# Patient Record
Sex: MALE | Race: WHITE | Employment: FULL TIME | URBAN - NONMETROPOLITAN AREA
[De-identification: names, ages, dates, MRNs, and addresses within clinical notes are randomized per-mention and may not be internally consistent; named-entity substitution may affect disease eponyms.]

---

## 2017-10-04 ENCOUNTER — TELEPHONE (OUTPATIENT)
Dept: FAMILY MEDICINE CLINIC | Age: 67
End: 2017-10-04

## 2017-10-04 NOTE — TELEPHONE ENCOUNTER
10/04/2017 Patient called office saying he tore something in  Left shoulder and in severe pain. Patient wanted to see Giovani Grant this morning however Wing's schedule full. Please advise patient at work [phone]. da

## 2017-10-04 NOTE — TELEPHONE ENCOUNTER
Sorry did not see message until now. Can see tomorrow. Can take advil and ice it until he comes in.   Otherwise can go to urgent care

## 2017-10-05 ENCOUNTER — OFFICE VISIT (OUTPATIENT)
Dept: FAMILY MEDICINE CLINIC | Age: 67
End: 2017-10-05
Payer: MEDICARE

## 2017-10-05 VITALS
RESPIRATION RATE: 15 BRPM | DIASTOLIC BLOOD PRESSURE: 79 MMHG | HEART RATE: 68 BPM | BODY MASS INDEX: 25.82 KG/M2 | WEIGHT: 194.8 LBS | HEIGHT: 73 IN | SYSTOLIC BLOOD PRESSURE: 139 MMHG

## 2017-10-05 DIAGNOSIS — M54.12 CERVICAL RADICULOPATHY: Primary | ICD-10-CM

## 2017-10-05 PROCEDURE — 1123F ACP DISCUSS/DSCN MKR DOCD: CPT | Performed by: NURSE PRACTITIONER

## 2017-10-05 PROCEDURE — 1036F TOBACCO NON-USER: CPT | Performed by: NURSE PRACTITIONER

## 2017-10-05 PROCEDURE — G8484 FLU IMMUNIZE NO ADMIN: HCPCS | Performed by: NURSE PRACTITIONER

## 2017-10-05 PROCEDURE — 3017F COLORECTAL CA SCREEN DOC REV: CPT | Performed by: NURSE PRACTITIONER

## 2017-10-05 PROCEDURE — G8419 CALC BMI OUT NRM PARAM NOF/U: HCPCS | Performed by: NURSE PRACTITIONER

## 2017-10-05 PROCEDURE — G8427 DOCREV CUR MEDS BY ELIG CLIN: HCPCS | Performed by: NURSE PRACTITIONER

## 2017-10-05 PROCEDURE — 99213 OFFICE O/P EST LOW 20 MIN: CPT | Performed by: NURSE PRACTITIONER

## 2017-10-05 PROCEDURE — 4040F PNEUMOC VAC/ADMIN/RCVD: CPT | Performed by: NURSE PRACTITIONER

## 2017-10-05 ASSESSMENT — ENCOUNTER SYMPTOMS
RESPIRATORY NEGATIVE: 1
EYES NEGATIVE: 1
GASTROINTESTINAL NEGATIVE: 1

## 2017-10-05 NOTE — PROGRESS NOTES
Subjective:      Patient ID: Jack Hood is a 79 y.o. male. HPI  Chief Complaint   Patient presents with    Shoulder Pain     Left side started in June      Patient's medications, allergies, past medical, surgical, social and family histories were reviewed and updated as appropriate. There is no problem list on file for this patient. No Known Allergies  Health Maintenance   Topic Date Due    AAA screen  1950    Hepatitis C screen  1950    DTaP/Tdap/Td vaccine (1 - Tdap) 08/15/2012    Pneumococcal low/med risk (1 of 2 - PCV13) 05/17/2015    Flu vaccine (1) 09/01/2017    Lipid screen  05/14/2021    Colon cancer screen colonoscopy  06/04/2025    Zostavax vaccine  Addressed     Current Outpatient Prescriptions   Medication Sig Dispense Refill    Psyllium (METAMUCIL PO) Take by mouth      Coenzyme Q10 (COQ10 PO) Take  by mouth.  Omega-3 Fatty Acids (OMEGA 3 PO) Take  by mouth.  Flaxseed, Linseed, (FLAX SEED OIL PO) Take  by mouth.  Jocelyn, Morinda citrifolia, (JOCELYN JUICE PO) Take  by mouth.  Misc Natural Products (BUTCHERS BROOM PO) Take  by mouth.  Multiple Vitamin (MULTI VITAMIN MENS PO) Take  by mouth. No current facility-administered medications for this visit. Interesting pt. Reports 3 months ago started having sharp electrical like pains shooting down left arm. Occur when he lays down or when he turns his head to the right. Tried a chiropractor without relief. Been exercising without relief    Review of Systems   Constitutional: Negative. HENT: Negative. Eyes: Negative. Respiratory: Negative. Cardiovascular: Negative. Gastrointestinal: Negative. Musculoskeletal: Positive for arthralgias and neck pain. Skin: Negative. Neurological: Negative. Objective:   Physical Exam   Constitutional: He is oriented to person, place, and time. He appears well-developed and well-nourished. HENT:   Head: Normocephalic.    Right Ear: Tympanic membrane and external ear normal.   Left Ear: Tympanic membrane and external ear normal.   Nose: Nose normal.   Mouth/Throat: Oropharynx is clear and moist.   Neck: Normal range of motion. Neck supple. Cardiovascular: Normal rate, regular rhythm, normal heart sounds and intact distal pulses. Exam reveals no gallop and no friction rub. No murmur heard. Pulmonary/Chest: Effort normal and breath sounds normal. He has no wheezes. He has no rales. Abdominal: Soft. Bowel sounds are normal. There is no tenderness. There is no guarding. Musculoskeletal:        Cervical back: He exhibits decreased range of motion, tenderness, pain and spasm. Back:         Arms:  Lymphadenopathy:     He has no cervical adenopathy. Neurological: He is alert and oriented to person, place, and time. He has normal reflexes. Skin: Skin is warm. Psychiatric: He has a normal mood and affect. Assessment:      1. Cervical radiculopathy  MRI CERVICAL SPINE WO CONTRAST           Plan: With his atrophy I would like to have mri.   Pt has tried and failed conservative therapy

## 2017-10-05 NOTE — PATIENT INSTRUCTIONS
MRI Cervical spine WO contrast scheduled 10-9-17 @ 1:30PM - arrival time 1:00PM @ [facility] - pt is to bring med list - photo ID- insurance card to appt - pt notified and understanding voiced

## 2017-10-05 NOTE — MR AVS SNAPSHOT
your BMI by decreasing your calorie intake and becoming more physically active. Learn more at: [employer]             Today's Medication Changes          These changes are accurate as of: 10/5/17  1:48 PM.  If you have any questions, ask your nurse or doctor. STOP taking these medications           ACIDOPHILUS PO   Stopped by:  Barbara Barth NP       gabapentin 300 MG capsule   Commonly known as:  NEURONTIN   Stopped by:  Barbara Barth NP       GREEN TEA PO   Stopped by:  Barbara Barth NP       KRILL OIL PO   Stopped by:  Barbara Barth NP       valACYclovir 1 g tablet   Commonly known as:  VALTREX   Stopped by:  Barbara Barth NP               Your Current Medications Are              Psyllium (METAMUCIL PO) Take by mouth    Coenzyme Q10 (COQ10 PO) Take  by mouth. Omega-3 Fatty Acids (OMEGA 3 PO) Take  by mouth. Flaxseed, Linseed, (FLAX SEED OIL PO) Take  by mouth. Jocelyn, Morinda citrifolia, (JOCELYN JUICE PO) Take  by mouth. Misc Natural Products (BUTCHERS BROOM PO) Take  by mouth. Multiple Vitamin (MULTI VITAMIN MENS PO) Take  by mouth. Allergies           No Known Allergies         Additional Information        Basic Information     Date Of Birth Sex Race Ethnicity Preferred Language    1950 Male White Non-/Non  English      Problem List as of 10/5/2017  Date Reviewed: 1/27/2015          None      Immunizations as of 10/5/2017     Name Date    Pneumococcal Polysaccharide (Dpgbscynh50) 8/14/2012    Td 8/14/2012      Preventive Care        Date Due    One-time abdominal aortic aneurism (AAA) screening is recommended for all men between the age of 73-68 who have ever smoked 1950    Hepatitis C screening is recommended for all adults regardless of risk factors born between [facility] at least once (lifetime) who have never been tested.  1950    Tetanus Combination Vaccine (1 - Tdap) 8/15/2012 Pneumococcal Vaccines (two) for all adults aged 72 and over (1 of 2 - PCV13) 5/17/2015    Yearly Flu Vaccine (1) 9/1/2017    Cholesterol Screening 5/14/2021    Colonoscopy 6/4/2025            MyChart Signup           [facility] allows you to send messages to your doctor, view your test results, renew your prescriptions, schedule appointments, view visit notes, and more. How Do I Sign Up? 1. In your Internet browser, go to [email or web address]  2. Click on the Sign Up Now link in the Sign In box. You will see the New Member Sign Up page. 3. Enter your [facility] Access Code exactly as it appears below. You will not need to use this code after youve completed the sign-up process. If you do not sign up before the expiration date, you must request a new code. [facility] Access Code: [email or web address]  Expires: 12/4/2017  1:48 PM    4. Enter your Social Security Number ([id number]) and Date of Birth (mm/dd/yyyy) as indicated and click Submit. You will be taken to the next sign-up page. 5. Create a [facility] ID. This will be your [facility] login ID and cannot be changed, so think of one that is secure and easy to remember. 6. Create a [facility] password. You can change your password at any time. 7. Enter your Password Reset Question and Answer. This can be used at a later time if you forget your password. 8. Enter your e-mail address. You will receive e-mail notification when new information is available in [id number]. 9. Click Sign Up. You can now view your medical record. Additional Information  If you have questions, please contact the physician practice where you receive care. Remember, [facility] is NOT to be used for urgent needs. For medical emergencies, dial 911. For questions regarding your [facility] account call [phone]. If you have a clinical question, please call your doctor's office.

## 2017-10-09 ENCOUNTER — TELEPHONE (OUTPATIENT)
Dept: FAMILY MEDICINE CLINIC | Age: 67
End: 2017-10-09

## 2017-10-09 ENCOUNTER — HOSPITAL ENCOUNTER (OUTPATIENT)
Dept: MRI IMAGING | Age: 67
Discharge: HOME | End: 2017-10-09
Payer: MEDICARE

## 2017-10-09 DIAGNOSIS — M48.02 CERVICAL SPINAL STENOSIS: Primary | ICD-10-CM

## 2017-10-09 DIAGNOSIS — M54.12 CERVICAL RADICULOPATHY: ICD-10-CM

## 2017-10-09 PROCEDURE — 72141 MRI NECK SPINE W/O DYE: CPT

## 2017-10-09 NOTE — TELEPHONE ENCOUNTER
Please call pt. 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 This is what is causing his symptoms. I recommend to see a neurosurgeon. There is DENNISO. The new neurosurgeon at [facility].   And we also have a lot of patients that go to [place]

## 2017-10-10 NOTE — TELEPHONE ENCOUNTER
Called and spoke with Karen Landeros at Dr. Hilda Downs office, he is scheduling out until December 19th, stated she can get patient in with Dr. Natalie Wei partner named Dr. Susan Andrade in [place]. (spoke with cynthia and this is ok)      Scheduled patient with Dr. Susan Andrade for October 19th, Thursday at 1:00pm, arrive 15-20min prior. [facility], [address] [phone]   [phone]  Patient needs to bring insurance card, photo ID, med list, and get the [facility] [place] from [facility] [address] from MRI cervical spine. Faxed office notes, patient summary, and MRI results to Dr. Susan Andrade office.      **left message for patient to call back to notify of appointment date and time**

## 2017-10-23 ENCOUNTER — HOSPITAL ENCOUNTER (OUTPATIENT)
Age: 67
Discharge: HOME | End: 2017-10-23
Payer: MEDICARE

## 2017-10-23 ENCOUNTER — HOSPITAL ENCOUNTER (OUTPATIENT)
Dept: GENERAL RADIOLOGY | Age: 67
Discharge: HOME | End: 2017-10-23
Payer: MEDICARE

## 2017-10-23 DIAGNOSIS — Z01.811 PRE-OP CHEST EXAM: ICD-10-CM

## 2017-10-23 LAB
ANION GAP SERPL CALCULATED.3IONS-SCNC: 14 MEQ/L (ref 8–16)
APTT: 26.5 SECONDS (ref 22–38)
BASOPHILS # BLD: 0.8 %
BASOPHILS ABSOLUTE: 0 THOU/MM3 (ref 0–0.1)
BUN BLDV-MCNC: 12 MG/DL (ref 7–22)
CALCIUM SERPL-MCNC: 9.7 MG/DL (ref 8.5–10.5)
CHLORIDE BLD-SCNC: 102 MEQ/L (ref 98–111)
CO2: 27 MEQ/L (ref 23–33)
CREAT SERPL-MCNC: 0.6 MG/DL (ref 0.4–1.2)
EOSINOPHIL # BLD: 4.2 %
EOSINOPHILS ABSOLUTE: 0.2 THOU/MM3 (ref 0–0.4)
GFR SERPL CREATININE-BSD FRML MDRD: > 90 ML/MIN/1.73M2
GLUCOSE BLD-MCNC: 95 MG/DL (ref 70–108)
HCT VFR BLD CALC: 40.5 % (ref 42–52)
HEMOGLOBIN: 13.8 GM/DL (ref 14–18)
INR BLD: 0.85 (ref 0.85–1.13)
LYMPHOCYTES # BLD: 27.8 %
LYMPHOCYTES ABSOLUTE: 1.3 THOU/MM3 (ref 1–4.8)
MCH RBC QN AUTO: 33.1 PG (ref 27–31)
MCHC RBC AUTO-ENTMCNC: 34 GM/DL (ref 33–37)
MCV RBC AUTO: 97.2 FL (ref 80–94)
MONOCYTES # BLD: 12.5 %
MONOCYTES ABSOLUTE: 0.6 THOU/MM3 (ref 0.4–1.3)
NUCLEATED RED BLOOD CELLS: 0 /100 WBC
PDW BLD-RTO: 12.8 % (ref 11.5–14.5)
PLATELET # BLD: 202 THOU/MM3 (ref 130–400)
PMV BLD AUTO: 10.1 MCM (ref 7.4–10.4)
POTASSIUM SERPL-SCNC: 4.3 MEQ/L (ref 3.5–5.2)
RBC # BLD: 4.16 MILL/MM3 (ref 4.7–6.1)
RBC # BLD: NORMAL 10*6/UL
SEG NEUTROPHILS: 54.7 %
SEGMENTED NEUTROPHILS ABSOLUTE COUNT: 2.5 THOU/MM3 (ref 1.8–7.7)
SODIUM BLD-SCNC: 143 MEQ/L (ref 135–145)
WBC # BLD: 4.5 THOU/MM3 (ref 4.8–10.8)

## 2017-10-23 PROCEDURE — 93005 ELECTROCARDIOGRAM TRACING: CPT

## 2017-10-23 PROCEDURE — 71020 XR CHEST STANDARD TWO VW: CPT

## 2017-10-23 PROCEDURE — 85610 PROTHROMBIN TIME: CPT

## 2017-10-23 PROCEDURE — 85730 THROMBOPLASTIN TIME PARTIAL: CPT

## 2017-10-23 PROCEDURE — 80048 BASIC METABOLIC PNL TOTAL CA: CPT

## 2017-10-23 PROCEDURE — 36415 COLL VENOUS BLD VENIPUNCTURE: CPT

## 2017-10-23 PROCEDURE — 85025 COMPLETE CBC W/AUTO DIFF WBC: CPT

## 2017-10-24 LAB
EKG ATRIAL RATE: 70 BPM
EKG P AXIS: 26 DEGREES
EKG P-R INTERVAL: 192 MS
EKG Q-T INTERVAL: 412 MS
EKG QRS DURATION: 110 MS
EKG QTC CALCULATION (BAZETT): 444 MS
EKG R AXIS: 71 DEGREES
EKG T AXIS: 67 DEGREES
EKG VENTRICULAR RATE: 70 BPM

## 2017-10-31 ENCOUNTER — OFFICE VISIT (OUTPATIENT)
Dept: FAMILY MEDICINE CLINIC | Age: 67
End: 2017-10-31
Payer: MEDICARE

## 2017-10-31 VITALS
HEART RATE: 68 BPM | DIASTOLIC BLOOD PRESSURE: 82 MMHG | WEIGHT: 196.4 LBS | HEIGHT: 73 IN | SYSTOLIC BLOOD PRESSURE: 138 MMHG | BODY MASS INDEX: 26.03 KG/M2 | RESPIRATION RATE: 16 BRPM

## 2017-10-31 DIAGNOSIS — M54.12 CERVICAL RADICULOPATHY: ICD-10-CM

## 2017-10-31 DIAGNOSIS — M48.02 CERVICAL SPINAL STENOSIS: Primary | ICD-10-CM

## 2017-10-31 PROCEDURE — G8427 DOCREV CUR MEDS BY ELIG CLIN: HCPCS | Performed by: NURSE PRACTITIONER

## 2017-10-31 PROCEDURE — 4040F PNEUMOC VAC/ADMIN/RCVD: CPT | Performed by: NURSE PRACTITIONER

## 2017-10-31 PROCEDURE — G8484 FLU IMMUNIZE NO ADMIN: HCPCS | Performed by: NURSE PRACTITIONER

## 2017-10-31 PROCEDURE — 3017F COLORECTAL CA SCREEN DOC REV: CPT | Performed by: NURSE PRACTITIONER

## 2017-10-31 PROCEDURE — 1036F TOBACCO NON-USER: CPT | Performed by: NURSE PRACTITIONER

## 2017-10-31 PROCEDURE — 99214 OFFICE O/P EST MOD 30 MIN: CPT | Performed by: NURSE PRACTITIONER

## 2017-10-31 PROCEDURE — G8419 CALC BMI OUT NRM PARAM NOF/U: HCPCS | Performed by: NURSE PRACTITIONER

## 2017-10-31 PROCEDURE — 1123F ACP DISCUSS/DSCN MKR DOCD: CPT | Performed by: NURSE PRACTITIONER

## 2017-10-31 NOTE — PROGRESS NOTES
Subjective:    Chief Complaint:     Chel Maloney is a 79 y.o. male who presents for a preoperative physical examination. He is scheduled to have cervical fusion c5-c7 done by Dr. Marlon Acosta at [address] [facility] on 11/17. History of Present Illness:      Progressive neck pain with pain into left arm. Has developed atrophy and weakness into arm    Past Medical History:   Diagnosis Date    Blockage of coronary artery of heart ([facility] [address] 2010    40-50% blockage    Hyperlipidemia         Review of patient's past surgical history indicates:     Past Surgical History:   Procedure Laterality Date    CARDIAC CATHETERIZATION      HERNIA REPAIR      right inguinal                                                   Current Outpatient Prescriptions   Medication Sig Dispense Refill    Psyllium (METAMUCIL PO) Take by mouth      Coenzyme Q10 (COQ10 PO) Take  by mouth.  Omega-3 Fatty Acids (OMEGA 3 PO) Take  by mouth.  Jocelyn, Morinda citrifolia, (JOCELYN JUICE PO) Take  by mouth.  Misc Natural Products (BUTCHERS BROOM PO) Take  by mouth.  Multiple Vitamin (MULTI VITAMIN MENS PO) Take  by mouth. No current facility-administered medications for this visit.         No Known Allergies    Social History   Substance Use Topics    Smoking status: Former Smoker     Quit date: 8/14/1991    Smokeless tobacco: Never Used    Alcohol use 1.5 oz/week     3 drink(s) per week      Comment: daily        Family History   Problem Relation Age of Onset    Cancer Paternal Uncle [de-identified]     prostate cancer    High Blood Pressure Paternal Uncle     High Cholesterol Paternal Uncle     Heart Disease Paternal Grandfather 58     MI        Review Of Systems    Skin: no abnormal pigmentation, rash, scaling, itching, masses, hair or nail changes  Eyes: negative  Ears/Nose/Throat: negative  Respiratory: negative  Cardiovascular: negative  Gastrointestinal: negative  Genitourinary: negative  Musculoskeletal: negative  Neurologic:

## 2017-11-02 ENCOUNTER — OFFICE VISIT (OUTPATIENT)
Dept: CARDIOLOGY CLINIC | Age: 67
End: 2017-11-02
Payer: MEDICARE

## 2017-11-02 VITALS
HEIGHT: 73 IN | BODY MASS INDEX: 26.03 KG/M2 | WEIGHT: 196.4 LBS | SYSTOLIC BLOOD PRESSURE: 136 MMHG | HEART RATE: 84 BPM | DIASTOLIC BLOOD PRESSURE: 86 MMHG

## 2017-11-02 DIAGNOSIS — Z87.891 EX-SMOKER: ICD-10-CM

## 2017-11-02 DIAGNOSIS — Z01.818 PRE-OP EVALUATION: Primary | ICD-10-CM

## 2017-11-02 DIAGNOSIS — R94.31 ABNORMAL EKG: ICD-10-CM

## 2017-11-02 PROCEDURE — 4040F PNEUMOC VAC/ADMIN/RCVD: CPT | Performed by: INTERNAL MEDICINE

## 2017-11-02 PROCEDURE — 99204 OFFICE O/P NEW MOD 45 MIN: CPT | Performed by: INTERNAL MEDICINE

## 2017-11-02 PROCEDURE — G8419 CALC BMI OUT NRM PARAM NOF/U: HCPCS | Performed by: INTERNAL MEDICINE

## 2017-11-02 PROCEDURE — 1123F ACP DISCUSS/DSCN MKR DOCD: CPT | Performed by: INTERNAL MEDICINE

## 2017-11-02 PROCEDURE — G8484 FLU IMMUNIZE NO ADMIN: HCPCS | Performed by: INTERNAL MEDICINE

## 2017-11-02 PROCEDURE — 3017F COLORECTAL CA SCREEN DOC REV: CPT | Performed by: INTERNAL MEDICINE

## 2017-11-02 PROCEDURE — 1036F TOBACCO NON-USER: CPT | Performed by: INTERNAL MEDICINE

## 2017-11-02 PROCEDURE — G8427 DOCREV CUR MEDS BY ELIG CLIN: HCPCS | Performed by: INTERNAL MEDICINE

## 2017-11-02 NOTE — PROGRESS NOTES
Chief Complaint   Patient presents with    Cardiac Clearance     neck sx nov 16 with Dr. Kelsey Mesa   Patient here for cardiac clearance -Cervical fusion- neck surgery nov 17 with Dr. Kelsey Mesa    Denied any cp, sob, dizziness, edema or palpitations    Pre op eval  Walk well with no limitation  METs> 4    exmoker - quit 1991 after smoking 18 yrs    FHX   None    Patient Active Problem List   Diagnosis    Pre-op evaluation for cervical fusion    Abnormal EKG    Ex-smoker       Past Surgical History:   Procedure Laterality Date    CARDIAC CATHETERIZATION      HERNIA REPAIR      right inguinal       No Known Allergies     Family History   Problem Relation Age of Onset    Cancer Paternal Uncle [de-identified]     prostate cancer    High Blood Pressure Paternal Uncle     High Cholesterol Paternal Uncle     Heart Disease Paternal Grandfather 58     MI        Social History     Social History    Marital status:      Spouse name: N/A    Number of children: N/A    Years of education: N/A     Occupational History    Not on file. Social History Main Topics    Smoking status: Former Smoker     Quit date: 8/14/1991    Smokeless tobacco: Never Used    Alcohol use 1.5 oz/week     3 drink(s) per week      Comment: daily    Drug use: No    Sexual activity: Not on file     Other Topics Concern    Not on file     Social History Narrative    No narrative on file       Current Outpatient Prescriptions   Medication Sig Dispense Refill    Psyllium (METAMUCIL PO) Take by mouth      Coenzyme Q10 (COQ10 PO) Take  by mouth.  Omega-3 Fatty Acids (OMEGA 3 PO) Take  by mouth.  Jocelyn, Morinda citrifolia, (JOCELYN JUICE PO) Take  by mouth.  Misc Natural Products (BUTCHERS BROOM PO) Take  by mouth.  Multiple Vitamin (MULTI VITAMIN MENS PO) Take  by mouth. No current facility-administered medications for this visit.         Review of Systems -     General ROS: negative  Psychological ROS: negative  Hematological and Lymphatic ROS: No history of blood clots or bleeding disorder. Respiratory ROS: no cough, shortness of breath, or wheezing  Cardiovascular ROS: no chest pain or dyspnea on exertion  Gastrointestinal ROS: negative  Genito-Urinary ROS: no dysuria, trouble voiding, or hematuria  Musculoskeletal ROS: negative  Neurological ROS: no TIA or stroke symptoms  Dermatological ROS: negative      Blood pressure 136/86, pulse 84, height 6' 1\" (1.854 m), weight 196 lb 6.4 oz (89.1 kg). Physical Examination:    General appearance - alert, well appearing, and in no distress  Mental status - alert, oriented to person, place, and time  Neck - supple, no significant adenopathy, no JVD, or carotid bruits  Chest - clear to auscultation, no wheezes, rales or rhonchi, symmetric air entry  Heart - normal rate, regular rhythm, normal S1, S2, no murmurs, rubs, clicks or gallops  Abdomen - soft, nontender, nondistended, no masses or organomegaly  Neurological - alert, oriented, normal speech, no focal findings or movement disorder noted  Musculoskeletal - no joint tenderness, deformity or swelling  Extremities - peripheral pulses normal, no pedal edema, no clubbing or cyanosis  Skin - normal coloration and turgor, no rashes, no suspicious skin lesions noted    Lab  No results for input(s): CKTOTAL, CKMB, CKMBINDEX, TROPONINI in the last 72 hours.   CBC:   Lab Results   Component Value Date    WBC 4.5 10/23/2017    RBC 4.16 10/23/2017    HGB 13.8 10/23/2017    HCT 40.5 10/23/2017    MCV 97.2 10/23/2017    MCH 33.1 10/23/2017    MCHC 34.0 10/23/2017    RDW 12.8 10/23/2017     10/23/2017    MPV 10.1 10/23/2017     BMP:    Lab Results   Component Value Date     10/23/2017    K 4.3 10/23/2017     10/23/2017    CO2 27 10/23/2017    BUN 12 10/23/2017    LABALBU 5.6 05/14/2016    CREATININE 0.6 10/23/2017    CALCIUM 9.7 10/23/2017    LABGLOM >90 10/23/2017    GLUCOSE 95 10/23/2017    GLUCOSE 95 05/14/2016     Hepatic

## 2017-11-13 ENCOUNTER — HOSPITAL ENCOUNTER (OUTPATIENT)
Dept: NON INVASIVE DIAGNOSTICS | Age: 67
Discharge: HOME | End: 2017-11-13
Payer: MEDICARE

## 2017-11-13 DIAGNOSIS — Z01.818 PRE-OP EVALUATION: ICD-10-CM

## 2017-11-13 DIAGNOSIS — R94.31 ABNORMAL EKG: ICD-10-CM

## 2017-11-13 LAB
LV EF: 50 %
LV EF: 63 %
LVEF MODALITY: NORMAL
LVEF MODALITY: NORMAL

## 2017-11-13 PROCEDURE — 93350 STRESS TTE ONLY: CPT

## 2017-11-13 PROCEDURE — 93017 CV STRESS TEST TRACING ONLY: CPT | Performed by: INTERNAL MEDICINE

## 2017-11-13 PROCEDURE — 93306 TTE W/DOPPLER COMPLETE: CPT

## 2017-11-14 ENCOUNTER — TELEPHONE (OUTPATIENT)
Dept: FAMILY MEDICINE CLINIC | Age: 67
End: 2017-11-14

## 2017-11-14 NOTE — TELEPHONE ENCOUNTER
An xray will not give us much information at all. Really the best thing is how he is feeling.   If he feels he is doing significantly better then I would delay surgery

## 2017-11-14 NOTE — TELEPHONE ENCOUNTER
Pt's wife left message for Eric Jackson - she is asking if an Xray,done prior to OR, would be able to show how much progress PT for 2-21/2wks has made - They are curious - they would like xray done tomorrow or Thursday if you feel it would show anything

## 2017-11-15 ENCOUNTER — TELEPHONE (OUTPATIENT)
Dept: CARDIOLOGY CLINIC | Age: 67
End: 2017-11-15

## 2017-11-15 DIAGNOSIS — R94.31 ABNORMAL EKG: Primary | ICD-10-CM

## 2017-11-21 ENCOUNTER — TELEPHONE (OUTPATIENT)
Dept: CARDIOLOGY CLINIC | Age: 67
End: 2017-11-21

## 2017-11-27 ENCOUNTER — TELEPHONE (OUTPATIENT)
Dept: FAMILY MEDICINE CLINIC | Age: 67
End: 2017-11-27

## 2017-11-27 RX ORDER — HYDROCHLOROTHIAZIDE 25 MG/1
25 TABLET ORAL DAILY
Qty: 5 TABLET | Refills: 0 | Status: SHIPPED | OUTPATIENT
Start: 2017-11-27 | End: 2017-12-07 | Stop reason: ALTCHOICE

## 2017-11-27 NOTE — TELEPHONE ENCOUNTER
Pt spouse calling today concerned about ankle swelling. He recently had neck surgery 10 days ago in [place] with Dr. Perfecto Ron. They are aware of swelling and advised pt to call PCP. No fever, redness or warm to touch. Please advise. No open appointment today.

## 2017-12-01 ENCOUNTER — TELEPHONE (OUTPATIENT)
Dept: FAMILY MEDICINE CLINIC | Age: 67
End: 2017-12-01

## 2017-12-01 RX ORDER — AZITHROMYCIN 250 MG/1
TABLET, FILM COATED ORAL
Qty: 1 PACKET | Refills: 0 | Status: SHIPPED | OUTPATIENT
Start: 2017-12-01 | End: 2017-12-07 | Stop reason: ALTCHOICE

## 2017-12-01 NOTE — TELEPHONE ENCOUNTER
Patient wife calling for patient stated he is running a low grade temp of . He feels fatigue and is having a hacking cough with green phlegm, Wife stated he can not be coughing like this because he just had neck surgery and needs antibiotic called in.  Please advise       Pharm[facility]

## 2017-12-07 ENCOUNTER — OFFICE VISIT (OUTPATIENT)
Dept: CARDIOLOGY CLINIC | Age: 67
End: 2017-12-07
Payer: MEDICARE

## 2017-12-07 VITALS
HEIGHT: 73 IN | BODY MASS INDEX: 25.18 KG/M2 | HEART RATE: 84 BPM | DIASTOLIC BLOOD PRESSURE: 84 MMHG | WEIGHT: 190 LBS | SYSTOLIC BLOOD PRESSURE: 140 MMHG

## 2017-12-07 DIAGNOSIS — Z87.891 EX-SMOKER: ICD-10-CM

## 2017-12-07 DIAGNOSIS — R94.31 ABNORMAL EKG: Primary | ICD-10-CM

## 2017-12-07 PROCEDURE — G8427 DOCREV CUR MEDS BY ELIG CLIN: HCPCS | Performed by: INTERNAL MEDICINE

## 2017-12-07 PROCEDURE — 4040F PNEUMOC VAC/ADMIN/RCVD: CPT | Performed by: INTERNAL MEDICINE

## 2017-12-07 PROCEDURE — G8419 CALC BMI OUT NRM PARAM NOF/U: HCPCS | Performed by: INTERNAL MEDICINE

## 2017-12-07 PROCEDURE — 99213 OFFICE O/P EST LOW 20 MIN: CPT | Performed by: INTERNAL MEDICINE

## 2017-12-07 PROCEDURE — G8484 FLU IMMUNIZE NO ADMIN: HCPCS | Performed by: INTERNAL MEDICINE

## 2017-12-07 PROCEDURE — 1123F ACP DISCUSS/DSCN MKR DOCD: CPT | Performed by: INTERNAL MEDICINE

## 2017-12-07 PROCEDURE — 3017F COLORECTAL CA SCREEN DOC REV: CPT | Performed by: INTERNAL MEDICINE

## 2017-12-07 PROCEDURE — 1036F TOBACCO NON-USER: CPT | Performed by: INTERNAL MEDICINE

## 2017-12-07 NOTE — PROGRESS NOTES
Chief Complaint   Patient presents with    Check-Up     echo, holtor monitor   Patient here for cardiac clearance -Cervical fusion- neck surgery nov 17 with Dr. Juliane Holman    Had  neck surgery. And went well    Doing good    Has cervical Collar    Denied any cp, sob, dizziness, edema or palpitations    Pre op eval  Walk well with no limitation  METs> 4    exmoker - quit 1991 after smoking 18 yrs    FHX   None    Patient Active Problem List   Diagnosis    Pre-op evaluation for cervical fusion    Abnormal EKG    Ex-smoker       Past Surgical History:   Procedure Laterality Date    CARDIAC CATHETERIZATION      HERNIA REPAIR      right inguinal       No Known Allergies     Family History   Problem Relation Age of Onset    Cancer Paternal Uncle [de-identified]     prostate cancer    High Blood Pressure Paternal Uncle     High Cholesterol Paternal Uncle     Heart Disease Paternal Grandfather 58     MI        Social History     Social History    Marital status:      Spouse name: N/A    Number of children: N/A    Years of education: N/A     Occupational History    Not on file. Social History Main Topics    Smoking status: Former Smoker     Quit date: 8/14/1991    Smokeless tobacco: Never Used    Alcohol use 1.5 oz/week     3 drink(s) per week      Comment: daily    Drug use: No    Sexual activity: Not on file     Other Topics Concern    Not on file     Social History Narrative    No narrative on file       Current Outpatient Prescriptions   Medication Sig Dispense Refill    Psyllium (METAMUCIL PO) Take by mouth      Coenzyme Q10 (COQ10 PO) Take  by mouth.  Omega-3 Fatty Acids (OMEGA 3 PO) Take  by mouth.  Jocelyn, Morinda citrifolia, (JOCELYN JUICE PO) Take  by mouth.  Misc Natural Products (BUTCHERS BROOM PO) Take  by mouth.  Multiple Vitamin (MULTI VITAMIN MENS PO) Take  by mouth. No current facility-administered medications for this visit.         Review of Systems -     General ROS:

## 2017-12-13 ENCOUNTER — HOSPITAL ENCOUNTER (OUTPATIENT)
Dept: GENERAL RADIOLOGY | Age: 67
Discharge: HOME | End: 2017-12-13
Payer: MEDICARE

## 2017-12-13 PROCEDURE — 93225 XTRNL ECG REC<48 HRS REC: CPT

## 2017-12-13 PROCEDURE — 93226 XTRNL ECG REC<48 HR SCAN A/R: CPT

## 2017-12-13 NOTE — PROCEDURES
Holter monitor applied. Instructed on diary completion and removal. Verbalizes understanding. Will return in 48 hours for removal. Released in satis condition.

## 2017-12-18 NOTE — PROCEDURES
[address] [place] [address]                                  HOLTER MONITOR    PATIENT NAME: Mayra Ruffin                     :        1950  MED REC NO:   [id number]                           ROOM:  ACCOUNT NO:   [de-identified]                           ADMIT DATE: 2017  PROVIDER:     Riley Gentile. Alvaro Crespo M.D.    Evangelinaga Rank:  2017    DURATION:  48 hours. QUALITY:  Reasonable. INDICATIONS:  Cardiac arrhythmia. FINDINGS:  The patient is in normal sinus rhythm, average heart rate of 56  beats per minute ranging from 51 to 118 beats per minute. Maximum R to R  interval is 1.8 seconds at 04:00 a.m. There is 980 ventricular ectopic  beats of which 964 are isolated, 4 couplets. There are 85 supraventricular  ectopic beats of which 82 are isolated, 4 couplets and one supraventricular  ectopic run composed of 3 beats at the rate of 120 beats per minute. DIARY:  Diary presented and basically no entry noted. CONCLUSION:  Normal sinus rhythm, average heart rate 76 beats per minutes  ranging from 51 to 118 beats per minute. No significant pause of more than  1.8 seconds noted. Occasional ventricular ectopic beats of total 980,  predominantly isolated and few couplets, rare supraventricular ectopic  beats a total of 85, predominantly isolated, few supraventricular bigeminy,  one supraventricular ectopic run composed of 3 beats at the rate of 120  beats per minute. No other form of arrhythmia. No atrial fibrillation. Jeremy Burks.  Jose Welch M.D.    D: 2017 8:19:44       T: 2017 9:50:29     [email or web address]  Job#: [id number]     Doc#: [id number]    CC:

## 2018-03-29 ENCOUNTER — OFFICE VISIT (OUTPATIENT)
Dept: CARDIOLOGY CLINIC | Age: 68
End: 2018-03-29
Payer: MEDICARE

## 2018-03-29 VITALS
SYSTOLIC BLOOD PRESSURE: 129 MMHG | HEIGHT: 73 IN | WEIGHT: 197.5 LBS | BODY MASS INDEX: 26.17 KG/M2 | HEART RATE: 68 BPM | DIASTOLIC BLOOD PRESSURE: 82 MMHG

## 2018-03-29 DIAGNOSIS — Z87.891 EX-SMOKER: ICD-10-CM

## 2018-03-29 DIAGNOSIS — R94.31 ABNORMAL EKG: Primary | ICD-10-CM

## 2018-03-29 PROCEDURE — G8427 DOCREV CUR MEDS BY ELIG CLIN: HCPCS | Performed by: INTERNAL MEDICINE

## 2018-03-29 PROCEDURE — 4040F PNEUMOC VAC/ADMIN/RCVD: CPT | Performed by: INTERNAL MEDICINE

## 2018-03-29 PROCEDURE — 99213 OFFICE O/P EST LOW 20 MIN: CPT | Performed by: INTERNAL MEDICINE

## 2018-03-29 PROCEDURE — G8419 CALC BMI OUT NRM PARAM NOF/U: HCPCS | Performed by: INTERNAL MEDICINE

## 2018-03-29 PROCEDURE — 1123F ACP DISCUSS/DSCN MKR DOCD: CPT | Performed by: INTERNAL MEDICINE

## 2018-03-29 PROCEDURE — 3017F COLORECTAL CA SCREEN DOC REV: CPT | Performed by: INTERNAL MEDICINE

## 2018-03-29 PROCEDURE — G8484 FLU IMMUNIZE NO ADMIN: HCPCS | Performed by: INTERNAL MEDICINE

## 2018-03-29 PROCEDURE — 1036F TOBACCO NON-USER: CPT | Performed by: INTERNAL MEDICINE

## 2018-03-29 NOTE — PROGRESS NOTES
10/23/2017    BUN 12 10/23/2017    LABALBU 5.6 05/14/2016    CREATININE 0.6 10/23/2017    CALCIUM 9.7 10/23/2017    LABGLOM >90 10/23/2017    GLUCOSE 95 10/23/2017    GLUCOSE 95 05/14/2016     Hepatic Function Panel:    Lab Results   Component Value Date    ALKPHOS 54 05/14/2016    ALT 22 05/14/2016    AST 27 05/14/2016    PROT 6.9 05/14/2016    BILITOT 1.0 05/14/2016    BILIDIR 0.1 05/14/2016    LABALBU 5.6 05/14/2016     Magnesium:  No results found for: MG  Warfarin PT/INR:  No components found for: PTPATWAR, PTINRWAR  HgBA1c:    Lab Results   Component Value Date    LABA1C 5.6 01/21/2015     FLP:    Lab Results   Component Value Date    TRIG 91 05/14/2016    HDL 90 05/14/2016    LDLCALC 127 05/14/2016    LDLDIRECT 174 01/21/2015     TSH:  No results found for: TSH    Normal sinus rhythm  Incomplete right bundle branch block  Borderline ECG  When compared with ECG of 19-OCT-2009 04:57,  [place] interval has decreased  Confirmed by [address] [place] [address] ([id number]) on 10/24/2017 5:27:17 AM    CONCLUSION:  Normal sinus rhythm, average heart rate 76 beats per minutes  ranging from 51 to 118 beats per minute. No significant pause of more than  1.8 seconds noted. Occasional ventricular ectopic beats of total 980,  predominantly isolated and few couplets, rare supraventricular ectopic  beats a total of 85, predominantly isolated, few supraventricular bigeminy,  one supraventricular ectopic run composed of 3 beats at the rate of 120  beats per minute. No other form of arrhythmia. No atrial fibrillation.           ALEJANDRO Guzmán M.D.     D: 12/18/2017 8:19:44             Assessment  NSVT and occasional PVCs noted in the recovery post walk /stress  Holter-occasional pvc, no NSVT    1. Abnormal EKG     2.  Ex-smoker         Plan   Pre op eval with abn ekg with IRBBB  exsmoker and age 79  The echo and functional study  With stress echo WNL  NSVT and occasional PVCs noted in the recovery post walk /stress  Holter 48 hrs-for NSVT and

## 2018-04-12 PROBLEM — Z01.818 PRE-OP EVALUATION: Status: RESOLVED | Noted: 2017-11-02 | Resolved: 2018-04-12

## 2019-10-16 RX ORDER — AZITHROMYCIN 250 MG/1
TABLET, FILM COATED ORAL
Qty: 1 PACKET | Refills: 0 | Status: SHIPPED | OUTPATIENT
Start: 2019-10-16

## 2023-04-19 ENCOUNTER — HOSPITAL ENCOUNTER (OUTPATIENT)
Age: 73
Discharge: HOME | End: 2023-04-19
Payer: MEDICARE

## 2023-04-19 LAB
ANION GAP SERPL CALC-SCNC: 10 MEQ/L (ref 8–16)
BUN SERPL-MCNC: 14 MG/DL (ref 7–22)
CALCIUM SERPL-MCNC: 9.8 MG/DL (ref 8.5–10.5)
CHLORIDE SERPL-SCNC: 100 MEQ/L (ref 98–111)
CO2 SERPL-SCNC: 30 MEQ/L (ref 23–33)
CREAT SERPL-MCNC: 0.9 MG/DL (ref 0.4–1.2)
EKG ATRIAL RATE: 60 BPM
EKG P AXIS: 40 DEGREES
EKG P-R INTERVAL: 212 MS
EKG Q-T INTERVAL: 442 MS
EKG QRS DURATION: 106 MS
EKG QTC CALCULATION (BAZETT): 442 MS
EKG R AXIS: 80 DEGREES
EKG T AXIS: 53 DEGREES
EKG VENTRICULAR RATE: 60 BPM
GFR SERPL CREATININE-BSD FRML MDRD: > 60 ML/MIN/1.73M2
GLUCOSE SERPL-MCNC: 90 MG/DL (ref 70–108)
HCT VFR BLD AUTO: 46.9 % (ref 42–52)
HGB BLD-MCNC: 15.2 GM/DL (ref 14–18)
POTASSIUM SERPL-SCNC: 4.5 MEQ/L (ref 3.5–5.2)
SODIUM SERPL-SCNC: 140 MEQ/L (ref 135–145)

## 2023-04-19 PROCEDURE — 36415 COLL VENOUS BLD VENIPUNCTURE: CPT

## 2023-04-19 PROCEDURE — 93005 ELECTROCARDIOGRAM TRACING: CPT | Performed by: SURGERY

## 2023-04-19 PROCEDURE — 85018 HEMOGLOBIN: CPT

## 2023-04-19 PROCEDURE — 80048 BASIC METABOLIC PNL TOTAL CA: CPT

## 2023-04-19 PROCEDURE — 85014 HEMATOCRIT: CPT

## 2023-04-19 PROCEDURE — 93010 ELECTROCARDIOGRAM REPORT: CPT | Performed by: INTERNAL MEDICINE

## 2023-04-21 RX ORDER — HYDROCHLOROTHIAZIDE 25 MG/1
25 TABLET ORAL DAILY
COMMUNITY

## 2023-04-21 RX ORDER — PHENOL 1.4 %
1 AEROSOL, SPRAY (ML) MUCOUS MEMBRANE DAILY
COMMUNITY

## 2023-04-24 ENCOUNTER — OFFICE VISIT (OUTPATIENT)
Dept: CARDIOLOGY CLINIC | Age: 73
End: 2023-04-24
Payer: MEDICARE

## 2023-04-24 VITALS
WEIGHT: 191 LBS | SYSTOLIC BLOOD PRESSURE: 151 MMHG | HEIGHT: 73 IN | BODY MASS INDEX: 25.31 KG/M2 | DIASTOLIC BLOOD PRESSURE: 91 MMHG | HEART RATE: 64 BPM

## 2023-04-24 DIAGNOSIS — R06.09 DOE (DYSPNEA ON EXERTION): ICD-10-CM

## 2023-04-24 DIAGNOSIS — Z87.891 EX-SMOKER: ICD-10-CM

## 2023-04-24 DIAGNOSIS — Z01.818 PRE-OP EVALUATION: Primary | ICD-10-CM

## 2023-04-24 DIAGNOSIS — R94.31 ABNORMAL EKG: ICD-10-CM

## 2023-04-24 PROCEDURE — 99204 OFFICE O/P NEW MOD 45 MIN: CPT | Performed by: INTERNAL MEDICINE

## 2023-04-24 PROCEDURE — 1036F TOBACCO NON-USER: CPT | Performed by: INTERNAL MEDICINE

## 2023-04-24 PROCEDURE — G8427 DOCREV CUR MEDS BY ELIG CLIN: HCPCS | Performed by: INTERNAL MEDICINE

## 2023-04-24 PROCEDURE — 3017F COLORECTAL CA SCREEN DOC REV: CPT | Performed by: INTERNAL MEDICINE

## 2023-04-24 PROCEDURE — 1123F ACP DISCUSS/DSCN MKR DOCD: CPT | Performed by: INTERNAL MEDICINE

## 2023-04-24 PROCEDURE — G8417 CALC BMI ABV UP PARAM F/U: HCPCS | Performed by: INTERNAL MEDICINE

## 2023-04-24 NOTE — PROGRESS NOTES
pressure (!) 151/91, pulse 64, height 6' 1\" (1.854 m), weight 191 lb (86.6 kg). Physical Examination:    General appearance - alert, well appearing, and in no distress  Mental status - alert, oriented to person, place, and time  Neck - supple, no significant adenopathy, no JVD, or carotid bruits  Chest - clear to auscultation, no wheezes, rales or rhonchi, symmetric air entry  Heart - normal rate, regular rhythm, normal S1, S2, no murmurs, rubs, clicks or gallops  Abdomen - soft, nontender, nondistended, no masses or organomegaly  Neurological - alert, oriented, normal speech, no focal findings or movement disorder noted  Musculoskeletal - no joint tenderness, deformity or swelling  Extremities - peripheral pulses normal, no pedal edema, no clubbing or cyanosis  Skin - normal coloration and turgor, no rashes, no suspicious skin lesions noted    Lab  No results for input(s): CKTOTAL, CKMB, CKMBINDEX, TROPONINI in the last 72 hours.   CBC:   Lab Results   Component Value Date/Time    WBC 4.5 10/23/2017 09:47 AM    RBC 4.16 10/23/2017 09:47 AM    HGB 15.2 04/19/2023 11:00 AM    HCT 46.9 04/19/2023 11:00 AM    MCV 97.2 10/23/2017 09:47 AM    MCH 33.1 10/23/2017 09:47 AM    MCHC 34.0 10/23/2017 09:47 AM    RDW 12.8 10/23/2017 09:47 AM     10/23/2017 09:47 AM    MPV 10.1 10/23/2017 09:47 AM     BMP:    Lab Results   Component Value Date/Time     04/19/2023 11:00 AM    K 4.5 04/19/2023 11:00 AM     04/19/2023 11:00 AM    CO2 30 04/19/2023 11:00 AM    BUN 14 04/19/2023 11:00 AM    LABALBU 5.6 05/14/2016 06:30 AM    CREATININE 0.9 04/19/2023 11:00 AM    CALCIUM 9.8 04/19/2023 11:00 AM    LABGLOM >60 04/19/2023 11:00 AM    GLUCOSE 90 04/19/2023 11:00 AM    GLUCOSE 95 05/14/2016 06:30 AM     Hepatic Function Panel:    Lab Results   Component Value Date/Time    ALKPHOS 54 05/14/2016 06:30 AM    ALT 22 05/14/2016 06:30 AM    AST 27 05/14/2016 06:30 AM    PROT 6.9 05/14/2016 06:30 AM    BILITOT 1.0

## 2023-04-25 ENCOUNTER — HOSPITAL ENCOUNTER (OUTPATIENT)
Dept: NON INVASIVE DIAGNOSTICS | Age: 73
Discharge: HOME | End: 2023-04-25
Payer: MEDICARE

## 2023-04-25 VITALS — HEIGHT: 73 IN | WEIGHT: 185 LBS | BODY MASS INDEX: 24.52 KG/M2

## 2023-04-25 DIAGNOSIS — R94.31 ABNORMAL EKG: ICD-10-CM

## 2023-04-25 DIAGNOSIS — Z87.891 EX-SMOKER: ICD-10-CM

## 2023-04-25 DIAGNOSIS — Z01.818 PRE-OP EVALUATION: ICD-10-CM

## 2023-04-25 DIAGNOSIS — R06.09 DOE (DYSPNEA ON EXERTION): ICD-10-CM

## 2023-04-25 LAB
LV EF: 50 %
LV EF: 51 %
LVEF MODALITY: NORMAL
LVEF MODALITY: NORMAL

## 2023-04-25 PROCEDURE — 93017 CV STRESS TEST TRACING ONLY: CPT | Performed by: INTERNAL MEDICINE

## 2023-04-25 PROCEDURE — 3430000000 HC RX DIAGNOSTIC RADIOPHARMACEUTICAL: Performed by: INTERNAL MEDICINE

## 2023-04-25 PROCEDURE — 78452 HT MUSCLE IMAGE SPECT MULT: CPT | Performed by: INTERNAL MEDICINE

## 2023-04-25 PROCEDURE — A9500 TC99M SESTAMIBI: HCPCS | Performed by: INTERNAL MEDICINE

## 2023-04-25 PROCEDURE — 93306 TTE W/DOPPLER COMPLETE: CPT

## 2023-04-25 RX ORDER — TETRAKIS(2-METHOXYISOBUTYLISOCYANIDE)COPPER(I) TETRAFLUOROBORATE 1 MG/ML
10 INJECTION, POWDER, LYOPHILIZED, FOR SOLUTION INTRAVENOUS
Status: COMPLETED | OUTPATIENT
Start: 2023-04-25 | End: 2023-04-25

## 2023-04-25 RX ORDER — TETRAKIS(2-METHOXYISOBUTYLISOCYANIDE)COPPER(I) TETRAFLUOROBORATE 1 MG/ML
33.4 INJECTION, POWDER, LYOPHILIZED, FOR SOLUTION INTRAVENOUS
Status: COMPLETED | OUTPATIENT
Start: 2023-04-25 | End: 2023-04-25

## 2023-04-25 RX ADMIN — Medication 33.4 MILLICURIE: at 11:00

## 2023-04-25 RX ADMIN — Medication 10 MILLICURIE: at 10:00

## 2023-04-26 ENCOUNTER — TELEPHONE (OUTPATIENT)
Dept: CARDIOLOGY CLINIC | Age: 73
End: 2023-04-26

## 2023-04-26 NOTE — TELEPHONE ENCOUNTER
Review stress and echo  Laid out for Dr Mandy Camara to review  Is patient clear for surgery scheduled Friday?

## 2023-04-27 LAB
EKG ATRIAL RATE: 60 BPM
EKG P AXIS: 40 DEGREES
EKG P-R INTERVAL: 212 MS
EKG Q-T INTERVAL: 442 MS
EKG QRS DURATION: 106 MS
EKG QTC CALCULATION (BAZETT): 442 MS
EKG R AXIS: 80 DEGREES
EKG T AXIS: 53 DEGREES
EKG VENTRICULAR RATE: 60 BPM

## 2023-04-27 NOTE — H&P
[address] [address]  History and Physical Update      Pt Name: Daylin Calvo  MRN: [id number]  YOB: 1950  Date of evaluation: 4/27/2023    [x] I have examined the patient and reviewed the H&P/Consult and there are no changes to the patient or plans. [] I have examined the patient and reviewed the H&P/Consult and have noted the following changes:         Lanie Becker MD  Electronically signed 4/27/2023 at 7:54 PM

## 2023-04-28 ENCOUNTER — ANESTHESIA EVENT (OUTPATIENT)
Dept: OPERATING ROOM | Age: 73
End: 2023-04-28
Payer: MEDICARE

## 2023-04-28 ENCOUNTER — ANESTHESIA (OUTPATIENT)
Dept: OPERATING ROOM | Age: 73
End: 2023-04-28
Payer: MEDICARE

## 2023-04-28 ENCOUNTER — HOSPITAL ENCOUNTER (OUTPATIENT)
Age: 73
Setting detail: OUTPATIENT SURGERY
Discharge: HOME | End: 2023-04-28
Attending: SURGERY | Admitting: SURGERY
Payer: MEDICARE

## 2023-04-28 VITALS
WEIGHT: 187.4 LBS | TEMPERATURE: 96.4 F | SYSTOLIC BLOOD PRESSURE: 145 MMHG | HEIGHT: 73 IN | RESPIRATION RATE: 20 BRPM | DIASTOLIC BLOOD PRESSURE: 85 MMHG | BODY MASS INDEX: 24.84 KG/M2 | OXYGEN SATURATION: 100 % | HEART RATE: 67 BPM

## 2023-04-28 DIAGNOSIS — K40.90 UNILATERAL INGUINAL HERNIA WITHOUT OBSTRUCTION OR GANGRENE, RECURRENCE NOT SPECIFIED: Primary | ICD-10-CM

## 2023-04-28 PROCEDURE — 6370000000 HC RX 637 (ALT 250 FOR IP): Performed by: SURGERY

## 2023-04-28 PROCEDURE — 2709999900 HC NON-CHARGEABLE SUPPLY: Performed by: SURGERY

## 2023-04-28 PROCEDURE — 7100000011 HC PHASE II RECOVERY - ADDTL 15 MIN: Performed by: SURGERY

## 2023-04-28 PROCEDURE — 6360000002 HC RX W HCPCS

## 2023-04-28 PROCEDURE — 2580000003 HC RX 258: Performed by: SURGERY

## 2023-04-28 PROCEDURE — 3600000019 HC SURGERY ROBOT ADDTL 15MIN: Performed by: SURGERY

## 2023-04-28 PROCEDURE — 7100000010 HC PHASE II RECOVERY - FIRST 15 MIN: Performed by: SURGERY

## 2023-04-28 PROCEDURE — 2500000003 HC RX 250 WO HCPCS

## 2023-04-28 PROCEDURE — 7100000001 HC PACU RECOVERY - ADDTL 15 MIN: Performed by: SURGERY

## 2023-04-28 PROCEDURE — 3700000000 HC ANESTHESIA ATTENDED CARE: Performed by: SURGERY

## 2023-04-28 PROCEDURE — 6360000002 HC RX W HCPCS: Performed by: SURGERY

## 2023-04-28 PROCEDURE — 3600000009 HC SURGERY ROBOT BASE: Performed by: SURGERY

## 2023-04-28 PROCEDURE — C1781 MESH (IMPLANTABLE): HCPCS | Performed by: SURGERY

## 2023-04-28 PROCEDURE — 3700000001 HC ADD 15 MINUTES (ANESTHESIA): Performed by: SURGERY

## 2023-04-28 PROCEDURE — S2900 ROBOTIC SURGICAL SYSTEM: HCPCS | Performed by: SURGERY

## 2023-04-28 PROCEDURE — 6360000002 HC RX W HCPCS: Performed by: ANESTHESIOLOGY

## 2023-04-28 PROCEDURE — 7100000000 HC PACU RECOVERY - FIRST 15 MIN: Performed by: SURGERY

## 2023-04-28 PROCEDURE — 2500000003 HC RX 250 WO HCPCS: Performed by: SURGERY

## 2023-04-28 PROCEDURE — 2580000003 HC RX 258

## 2023-04-28 DEVICE — MESH HERN W10XL15CM POLY POLYLACTIC ACID 70% CLLGN 30% GLYC: Type: IMPLANTABLE DEVICE | Status: FUNCTIONAL

## 2023-04-28 RX ORDER — SODIUM CHLORIDE 0.9 % (FLUSH) 0.9 %
5-40 SYRINGE (ML) INJECTION PRN
Status: DISCONTINUED | OUTPATIENT
Start: 2023-04-28 | End: 2023-04-28 | Stop reason: HOSPADM

## 2023-04-28 RX ORDER — KETOROLAC TROMETHAMINE 30 MG/ML
INJECTION, SOLUTION INTRAMUSCULAR; INTRAVENOUS
Status: COMPLETED
Start: 2023-04-28 | End: 2023-04-28

## 2023-04-28 RX ORDER — FENTANYL CITRATE 50 UG/ML
25 INJECTION, SOLUTION INTRAMUSCULAR; INTRAVENOUS EVERY 5 MIN PRN
Status: DISCONTINUED | OUTPATIENT
Start: 2023-04-28 | End: 2023-04-28 | Stop reason: HOSPADM

## 2023-04-28 RX ORDER — DIPHENHYDRAMINE HYDROCHLORIDE 50 MG/ML
12.5 INJECTION INTRAMUSCULAR; INTRAVENOUS
Status: DISCONTINUED | OUTPATIENT
Start: 2023-04-28 | End: 2023-04-28 | Stop reason: HOSPADM

## 2023-04-28 RX ORDER — BUPIVACAINE HYDROCHLORIDE 5 MG/ML
INJECTION, SOLUTION PERINEURAL PRN
Status: DISCONTINUED | OUTPATIENT
Start: 2023-04-28 | End: 2023-04-28 | Stop reason: ALTCHOICE

## 2023-04-28 RX ORDER — FENTANYL CITRATE 50 UG/ML
INJECTION, SOLUTION INTRAMUSCULAR; INTRAVENOUS PRN
Status: DISCONTINUED | OUTPATIENT
Start: 2023-04-28 | End: 2023-04-28 | Stop reason: SDUPTHER

## 2023-04-28 RX ORDER — MEPERIDINE HYDROCHLORIDE 25 MG/ML
12.5 INJECTION INTRAMUSCULAR; INTRAVENOUS; SUBCUTANEOUS EVERY 4 HOURS PRN
Status: DISCONTINUED | OUTPATIENT
Start: 2023-04-28 | End: 2023-04-28 | Stop reason: HOSPADM

## 2023-04-28 RX ORDER — DEXAMETHASONE SODIUM PHOSPHATE 10 MG/ML
INJECTION, EMULSION INTRAMUSCULAR; INTRAVENOUS PRN
Status: DISCONTINUED | OUTPATIENT
Start: 2023-04-28 | End: 2023-04-28 | Stop reason: SDUPTHER

## 2023-04-28 RX ORDER — SODIUM CHLORIDE 9 MG/ML
INJECTION, SOLUTION INTRAVENOUS CONTINUOUS PRN
Status: DISCONTINUED | OUTPATIENT
Start: 2023-04-28 | End: 2023-04-28 | Stop reason: SDUPTHER

## 2023-04-28 RX ORDER — GLYCOPYRROLATE 0.2 MG/ML
INJECTION INTRAMUSCULAR; INTRAVENOUS PRN
Status: DISCONTINUED | OUTPATIENT
Start: 2023-04-28 | End: 2023-04-28 | Stop reason: SDUPTHER

## 2023-04-28 RX ORDER — FENTANYL CITRATE 50 UG/ML
50 INJECTION, SOLUTION INTRAMUSCULAR; INTRAVENOUS EVERY 5 MIN PRN
Status: DISCONTINUED | OUTPATIENT
Start: 2023-04-28 | End: 2023-04-28 | Stop reason: HOSPADM

## 2023-04-28 RX ORDER — METOCLOPRAMIDE HYDROCHLORIDE 5 MG/ML
10 INJECTION INTRAMUSCULAR; INTRAVENOUS
Status: DISCONTINUED | OUTPATIENT
Start: 2023-04-28 | End: 2023-04-28 | Stop reason: HOSPADM

## 2023-04-28 RX ORDER — ROCURONIUM BROMIDE 10 MG/ML
INJECTION, SOLUTION INTRAVENOUS PRN
Status: DISCONTINUED | OUTPATIENT
Start: 2023-04-28 | End: 2023-04-28 | Stop reason: SDUPTHER

## 2023-04-28 RX ORDER — SODIUM CHLORIDE 0.9 % (FLUSH) 0.9 %
5-40 SYRINGE (ML) INJECTION EVERY 12 HOURS SCHEDULED
Status: DISCONTINUED | OUTPATIENT
Start: 2023-04-28 | End: 2023-04-28 | Stop reason: HOSPADM

## 2023-04-28 RX ORDER — SODIUM CHLORIDE 9 MG/ML
INJECTION, SOLUTION INTRAVENOUS CONTINUOUS
Status: DISCONTINUED | OUTPATIENT
Start: 2023-04-28 | End: 2023-04-28 | Stop reason: HOSPADM

## 2023-04-28 RX ORDER — KETOROLAC TROMETHAMINE 30 MG/ML
15 INJECTION, SOLUTION INTRAMUSCULAR; INTRAVENOUS ONCE
Status: COMPLETED | OUTPATIENT
Start: 2023-04-28 | End: 2023-04-28

## 2023-04-28 RX ORDER — OXYCODONE HYDROCHLORIDE AND ACETAMINOPHEN 5; 325 MG/1; MG/1
1 TABLET ORAL ONCE
Status: COMPLETED | OUTPATIENT
Start: 2023-04-28 | End: 2023-04-28

## 2023-04-28 RX ORDER — OXYCODONE HYDROCHLORIDE AND ACETAMINOPHEN 5; 325 MG/1; MG/1
1 TABLET ORAL EVERY 6 HOURS PRN
Qty: 16 TABLET | Refills: 0 | Status: SHIPPED | OUTPATIENT
Start: 2023-04-28 | End: 2023-05-03

## 2023-04-28 RX ORDER — ONDANSETRON 2 MG/ML
INJECTION INTRAMUSCULAR; INTRAVENOUS PRN
Status: DISCONTINUED | OUTPATIENT
Start: 2023-04-28 | End: 2023-04-28 | Stop reason: SDUPTHER

## 2023-04-28 RX ORDER — SODIUM CHLORIDE 9 MG/ML
INJECTION, SOLUTION INTRAVENOUS PRN
Status: DISCONTINUED | OUTPATIENT
Start: 2023-04-28 | End: 2023-04-28 | Stop reason: HOSPADM

## 2023-04-28 RX ORDER — PROPOFOL 10 MG/ML
INJECTION, EMULSION INTRAVENOUS PRN
Status: DISCONTINUED | OUTPATIENT
Start: 2023-04-28 | End: 2023-04-28 | Stop reason: SDUPTHER

## 2023-04-28 RX ADMIN — Medication 100 MG: at 08:03

## 2023-04-28 RX ADMIN — FENTANYL CITRATE 50 MCG: 50 INJECTION, SOLUTION INTRAMUSCULAR; INTRAVENOUS at 08:03

## 2023-04-28 RX ADMIN — SUGAMMADEX 200 MG: 100 INJECTION, SOLUTION INTRAVENOUS at 09:19

## 2023-04-28 RX ADMIN — DEXAMETHASONE SODIUM PHOSPHATE 10 MG: 10 INJECTION, EMULSION INTRAMUSCULAR; INTRAVENOUS at 08:20

## 2023-04-28 RX ADMIN — PROPOFOL 50 MG: 10 INJECTION, EMULSION INTRAVENOUS at 09:18

## 2023-04-28 RX ADMIN — CEFOXITIN 2000 MG: 2 INJECTION, POWDER, FOR SOLUTION INTRAVENOUS at 08:11

## 2023-04-28 RX ADMIN — PROPOFOL 150 MG: 10 INJECTION, EMULSION INTRAVENOUS at 08:03

## 2023-04-28 RX ADMIN — GLYCOPYRROLATE 0.2 MG: 0.2 INJECTION, SOLUTION INTRAMUSCULAR; INTRAVENOUS at 09:14

## 2023-04-28 RX ADMIN — FENTANYL CITRATE 50 MCG: 50 INJECTION, SOLUTION INTRAMUSCULAR; INTRAVENOUS at 09:54

## 2023-04-28 RX ADMIN — SODIUM CHLORIDE: 9 INJECTION, SOLUTION INTRAVENOUS at 07:09

## 2023-04-28 RX ADMIN — OXYCODONE AND ACETAMINOPHEN 1 TABLET: 5; 325 TABLET ORAL at 11:18

## 2023-04-28 RX ADMIN — ROCURONIUM BROMIDE 20 MG: 10 INJECTION INTRAVENOUS at 08:40

## 2023-04-28 RX ADMIN — GLYCOPYRROLATE 0.1 MG: 0.2 INJECTION, SOLUTION INTRAMUSCULAR; INTRAVENOUS at 08:19

## 2023-04-28 RX ADMIN — KETOROLAC TROMETHAMINE 15 MG: 30 INJECTION, SOLUTION INTRAMUSCULAR; INTRAVENOUS at 09:42

## 2023-04-28 RX ADMIN — GLYCOPYRROLATE 0.1 MG: 0.2 INJECTION, SOLUTION INTRAMUSCULAR; INTRAVENOUS at 08:25

## 2023-04-28 RX ADMIN — ROCURONIUM BROMIDE 50 MG: 10 INJECTION INTRAVENOUS at 08:03

## 2023-04-28 RX ADMIN — FENTANYL CITRATE 50 MCG: 50 INJECTION, SOLUTION INTRAMUSCULAR; INTRAVENOUS at 09:49

## 2023-04-28 RX ADMIN — ONDANSETRON 4 MG: 2 INJECTION INTRAMUSCULAR; INTRAVENOUS at 09:11

## 2023-04-28 RX ADMIN — FENTANYL CITRATE 50 MCG: 50 INJECTION, SOLUTION INTRAMUSCULAR; INTRAVENOUS at 08:40

## 2023-04-28 RX ADMIN — SODIUM CHLORIDE: 9 INJECTION, SOLUTION INTRAVENOUS at 07:59

## 2023-04-28 RX ADMIN — PROPOFOL 50 MG: 10 INJECTION, EMULSION INTRAVENOUS at 08:40

## 2023-04-28 ASSESSMENT — PAIN DESCRIPTION - PAIN TYPE: TYPE: SURGICAL PAIN

## 2023-04-28 ASSESSMENT — ENCOUNTER SYMPTOMS: SHORTNESS OF BREATH: 1

## 2023-04-28 ASSESSMENT — PAIN DESCRIPTION - LOCATION
LOCATION: ABDOMEN

## 2023-04-28 ASSESSMENT — PAIN DESCRIPTION - FREQUENCY: FREQUENCY: CONTINUOUS

## 2023-04-28 ASSESSMENT — PAIN SCALES - GENERAL
PAINLEVEL_OUTOF10: 1
PAINLEVEL_OUTOF10: 4
PAINLEVEL_OUTOF10: 7
PAINLEVEL_OUTOF10: 2
PAINLEVEL_OUTOF10: 2
PAINLEVEL_OUTOF10: 7
PAINLEVEL_OUTOF10: 4

## 2023-04-28 ASSESSMENT — PAIN - FUNCTIONAL ASSESSMENT: PAIN_FUNCTIONAL_ASSESSMENT: 0-10

## 2023-04-28 ASSESSMENT — PAIN DESCRIPTION - DESCRIPTORS: DESCRIPTORS: ACHING

## 2023-04-28 NOTE — ANESTHESIA PRE PROCEDURE
Department of Anesthesiology  Preprocedure Note       Name:  Calvin Mitchell   Age:  67 y.o.  :  1950                                          MRN:  [id number]         Date:  2023      Surgeon: Jem Diaz):  Neil Ortiz MD    Procedure: Procedure(s):  Robotic assisted laparoscopic right iguinal hernia repair, possible mesh, possible open. Medications prior to admission:   Prior to Admission medications    Medication Sig Start Date End Date Taking?  Authorizing Provider   calcium carbonate 600 MG TABS tablet Take 1 tablet by mouth daily   Yes Historical Provider, MD   Gluc-Chonn-MSM-Boswellia-Vit D (GLUCOS CHONDROIT, BOSWELLIA, PO) Take 2 tablets by mouth daily   Yes Historical Provider, MD Banks, Morinda citrifolia, (FRANCESCO JUICE PO) Take 1 Bottle by mouth daily 2-3 OZ IN AM   Yes Historical Provider, MD   hydroCHLOROthiazide (HYDRODIURIL) 25 MG tablet Take 1 tablet by mouth daily   Yes Historical Provider, MD   Nutritional Supplements (KETO PO) Take 2 capsules by mouth daily   Yes Historical Provider, MD   Cholecalciferol (VITAMIN D3 PO) Take 1 tablet by mouth daily 5000 IU    Historical Provider, MD   Inulin (FIBER CHOICE PO) Take 5 tablets by mouth daily    Historical Provider, MD   Cyanocobalamin (VITAMIN B-12 PO) Take 1,000 mcg by mouth daily    Historical Provider, MD   Coenzyme Q10 (COQ10 PO) Take 200 mg by mouth daily    Historical Provider, MD   Omega-3 Fatty Acids (OMEGA 3 PO) Take by mouth daily Takes 3 tabs daily omega 6 552 mg omega 9 340 mg    Historical Provider, MD   Multiple Vitamin (MULTI VITAMIN MENS PO) Take 1 tablet by mouth daily    Historical Provider, MD       Current medications:    Current Facility-Administered Medications   Medication Dose Route Frequency Provider Last Rate Last Admin    cefOXitin (MEFOXIN) 2,000 mg in sodium chloride 0.9 % 50 mL IVPB (mini-bag)  2,000 mg IntraVENous On Call to [address], MD        0.9 % sodium chloride infusion   IntraVENous

## 2023-04-28 NOTE — H&P
[address] [place] [address]                       PREOPERATIVE HISTORY AND PHYSICAL    PATIENT NAME: Shalini Ghosh                     :        1950  MED REC NO:   [id number]                           ROOM:  ACCOUNT NO:   [de-identified]                           ADMIT DATE: 2023  PROVIDER:     Gavino Mathis. Anna Cross MD    CHIEF COMPLAINT:  Recurrent right inguinal hernia. HISTORY OF PRESENT ILLNESS:  The patient is a 68-year-old male, very  healthy who had had a previous right inguinal hernia repair 30 years  ago. The patient was recently on a 31-day cruise, and during that  cruise apparently he got sick and had a significant coughing episode and  had onset of a right inguinal hernia. He is having mild pain. It is  reducible, but he is being admitted at this time for robotic-assisted  repair of recurrent right inguinal hernia. PAST MEDICAL HISTORY:  Positive for coronary artery disease and  hyperlipidemia. PAST SURGICAL HISTORY:  Includes a cardiac catheterization and a right  inguinal hernia repair as mentioned above. SOCIAL HISTORY:  He is a former smoker but quit in  [facility]. He is a social  drinker of alcohol. REVIEW OF SYSTEMS:  A 10-point review of systems is negative. FAMILY HISTORY:  Positive for prostate cancer in uncle. ALLERGIES:  NONE. MEDICATIONS:  Include supplements include CoQ10, fish oil, multivitamin,  vitamin D and B12. PHYSICAL EXAMINATION:  GENERAL:  The patient is a slender 68-year-old male who appears his age. HEAD, EARS, EYES, NOSE AND THROAT:  Show no scleral icterus. CARDIOVASCULAR:  S1, S2.  RESPIRATORY:  Respirations are clear. ABDOMEN:  Soft. Bowel sounds are positive. The patient does have a  reducible right inguinal hernia. ASSESSMENT AND PLAN:  Reducible right inguinal hernia. The patient was  seen by Dr. Gold Jerez and has been cleared for the surgery.   He is

## 2023-04-28 NOTE — BRIEF OP NOTE
Brief Postoperative Note      Patient: Ester Rodriguez  YOB: 1950  MRN: [id number]    Date of Procedure: 4/28/2023    Pre-Op Diagnosis Codes:     * Right inguinal hernia [K40.90]    Post-Op Diagnosis: Direct Ing Hernia       Procedure(s):  Robotic assisted laparoscopic right iguinal hernia repair with mesh    Surgeon(s):  Irena Wild MD    Assistant:  First Assistant: Pat Lucas RN    Anesthesia: General    Estimated Blood Loss (mL): 5 ml    Complications: none    Specimens:       Implants:  Implant Name Type Inv.  Item Serial No.  Lot No. LRB No. Used Action   MESH REA [id number] POLY POLYLACTIC ACID 70% CLLGN 30% GLYC - [id number]  MESH REA [id number] POLY POLYLACTIC ACID 70% CLLGN 30% GLYC  [facility] SURGICAL-WD [id number] Implanted         Drains:     Findings: see op note      Electronically signed by Irena Wild MD on 4/28/2023 at 9:29 AM

## 2023-04-28 NOTE — DISCHARGE INSTRUCTIONS
Office 2 weeks    GENERAL ANESTHESIA OR SEDATION  1. Do not drive or operate hazardous machinery for 24 hours. 2. Do not make important business or personal decisions for 24 hours. 3. Do not drink alcoholic beverages or use tobacco for 24 hours. ACTIVITY INSTRUCTIONS:  [] Rest today. Resume light to normal activity tomorrow.   [] You may resume normal activity tomorrow. Do not engage in strenuous activity that may place stress on your incision. [x] Do not drive for 3-5 days and avoid heavy lifting, tugging, pullings greater than 10-20 lbs until seen in the office. DIET INSTRUCTIONS:  []Begin with clear liquids. If not nauseated, may increase to a low-fat diet when you desire. Greasy and spicy foods are not advised. [x]Regular diet as tolerated. []Other:     MEDICATIONS  [x]Prescription sent with you to be used as directed. []Lortab   []Vicodin   [x]Percocet   []Tylenol #3   []Oxycontin   Do not drink alcohol or drive while taking these medications. You may experience dizziness or drowsiness with these medications. You may also experience constipation which can be relieved with stool softners or laxatives. [x]You may resume your daily prescription medication schedule unless otherwise specified. [x]Do not take 325mg Aspirin or other blood thinners such as Coumadin or Plavix for 5 days. WOUND/DRESSING INSTRUCTIONS:  Always ensure you and your care giver clean hands before and after caring for the wound. [x] Keep dressing clean and dry for 48 hours. Change when soiled or wet. [x] Allow steri-strips to fall off on their own. [x] Ice operative site for 20 minutes 4 times a day. [x] May wash over incision in shower in 48 hours, but do not soak in a bath.  [] Take sitz bath for 20 minutes twice daily and after bowel movements. [] Keep the abdominal binder in place during the day.  May remove to shower and at night.  [] Remove packing from wound in 24 hours and replace with AMD dressings

## 2023-04-28 NOTE — PROGRESS NOTES
8414: Pt arrives to pacu, awakens to verbal stimuli. Pt on room air, respirations easy and unlabored. VSS  0942: Pt states pain 4/10, 15mg of toradol administered  0949: Pt states pain increased and now 7/10, 50mcg of fentanyl administered  0954: No change in pain, 50mcg of fentanyl given  1000: Pt resting off and on with eyes closed, easily awakens to voice. States pain tolerable  1014: Pt meets criteria for discharge from pacu, transported to [facility] in stable condition.  VSS

## 2023-04-28 NOTE — PROGRESS NOTES
Pt admitted to [facility] room 6 and oriented to unit. SCD sleeves applied. Nares swabbed. Pt verbalized permission for first name, last initial and physicians name on white board. SDS board and discharge criteria explained, pt and family verbalized understanding. Pt denies thoughts of harming self or others. Call light in reach. Family at the bedside.

## 2023-04-28 NOTE — ANESTHESIA POSTPROCEDURE EVALUATION
Level of Consciousness:  Awake    Respiratory:  Stable    Oxygen Saturation:  Stable    Cardiovascular:  Stable    Hydration:  Adequate    PONV:  Stable    Post-op Pain:  Adequate analgesia    Post-op Assessment:  No apparent anesthetic complications    Additional Follow-Up / Treatment / Comment:  None    Tiffany Begum MD  April 28, 2023   11:23 AM

## 2023-04-28 NOTE — PROGRESS NOTES
Patient didn't come with dentures,jewelery,hearing aids,glasses and clothing. Foil warming blanket across the chest intraoperatively.

## 2023-04-29 NOTE — OP NOTE
[address] [place] [address]                                OPERATIVE REPORT    PATIENT NAME: Td Stanley                     :        1950  MED REC NO:   [id number]                           ROOM:  ACCOUNT NO:   [de-identified]                           ADMIT DATE: 2023  PROVIDER:     Helena Hernandez. Johnathon Ferreira MD    DATE OF PROCEDURE:  2023    PREOPERATIVE DIAGNOSIS:  Possible recurrent right inguinal hernia. POSTOPERATIVE DIAGNOSIS:  Right direct inguinal hernia, nonrecurrent. OPERATIONS:  Robotic-assisted repair of right inguinal hernia with  ProGrip mesh. SURGEON:  Helena Hernandez. MD Emile    ANESTHESIA:  General.    COMPLICATIONS:  None. ESTIMATED BLOOD LOSS:  5 mL. INDICATIONS FOR PROCEDURE:  The patient is a 70-year-old white male who  had stated he has had a right inguinal hernia repaired 30 plus years ago  and now has a recurrence. He is here for repair of same. Findings with  the hair clipped, we could clearly see the incision in the left groin. There was no incision in the right groin; it was probable that he had a  left inguinal hernia repaired in the past and this was a new inguinal  hernia on the right. Also the patient had a large direct defect. ProGrip mesh was used for repair. DESCRIPTION OF PROCEDURE:  The patient was brought to the operating  suite, placed supine on the operating room table. After adequate  inhalational anesthesia was administered, the patient's abdomen was  prepped and draped in usual fashion. It was at this time, we could see  a left inguinal incision that was well healed. We could not determine  any on the right groin and then a small incision was made above the  umbilicus. A Veress needle was placed. Robotic trocar was placed and  then under direct visualization, the other two robotic trocars were  placed. I took my place at the [facility].   The robot was brought in

## 2023-05-01 ENCOUNTER — HOSPITAL ENCOUNTER (EMERGENCY)
Age: 73
Discharge: HOME | End: 2023-05-01
Attending: FAMILY MEDICINE
Payer: MEDICARE

## 2023-05-01 ENCOUNTER — APPOINTMENT (OUTPATIENT)
Dept: GENERAL RADIOLOGY | Age: 73
End: 2023-05-01
Payer: MEDICARE

## 2023-05-01 VITALS
SYSTOLIC BLOOD PRESSURE: 153 MMHG | OXYGEN SATURATION: 96 % | TEMPERATURE: 97.8 F | WEIGHT: 197 LBS | HEIGHT: 73 IN | HEART RATE: 58 BPM | BODY MASS INDEX: 26.11 KG/M2 | RESPIRATION RATE: 18 BRPM | DIASTOLIC BLOOD PRESSURE: 67 MMHG

## 2023-05-01 DIAGNOSIS — K59.09 OTHER CONSTIPATION: ICD-10-CM

## 2023-05-01 DIAGNOSIS — K56.41 FECAL IMPACTION (HCC): Primary | ICD-10-CM

## 2023-05-01 PROCEDURE — 99283 EMERGENCY DEPT VISIT LOW MDM: CPT

## 2023-05-01 PROCEDURE — 74018 RADEX ABDOMEN 1 VIEW: CPT

## 2023-05-01 PROCEDURE — 6370000000 HC RX 637 (ALT 250 FOR IP): Performed by: FAMILY MEDICINE

## 2023-05-01 RX ORDER — SODIUM PHOSPHATE,MONO-DIBASIC 19G-7G/118
1 ENEMA (ML) RECTAL ONCE
Status: COMPLETED | OUTPATIENT
Start: 2023-05-01 | End: 2023-05-01

## 2023-05-01 RX ADMIN — SODIUM PHOSPHATE 1 ENEMA: 7; 19 ENEMA RECTAL at 09:55

## 2023-05-01 ASSESSMENT — ENCOUNTER SYMPTOMS
VOMITING: 0
CONSTIPATION: 1
COUGH: 0
NAUSEA: 0
SHORTNESS OF BREATH: 0
BACK PAIN: 0
ABDOMINAL PAIN: 1
DIARRHEA: 0

## 2023-05-01 ASSESSMENT — PAIN DESCRIPTION - PAIN TYPE: TYPE: ACUTE PAIN

## 2023-05-01 ASSESSMENT — PAIN DESCRIPTION - LOCATION: LOCATION: ABDOMEN

## 2023-05-01 ASSESSMENT — PAIN - FUNCTIONAL ASSESSMENT: PAIN_FUNCTIONAL_ASSESSMENT: 0-10

## 2023-05-01 ASSESSMENT — PAIN SCALES - GENERAL: PAINLEVEL_OUTOF10: 4

## 2023-05-01 NOTE — ED NOTES
AVS rev'd with pt. And copy given. Pulse regular. Extremities warm. Respirations regular and quiet. Mucous membranes pink & moist. Alert and oriented times 3. No nausea or vomiting. Range of motion within patient's limits. Skin pink, warm and dry. Calm and cooperative. Pt. Voices feeling much better. ABD soft with ABS.   Gama Monge RN  05/01/23 Bert Patsy E Denise [address] Paul RN  05/01/23 9860

## 2023-05-01 NOTE — ED PROVIDER NOTES
[facility]  eMERGENCY dEPARTMENT eNCOUnter          CHIEF COMPLAINT       Chief Complaint   Patient presents with    Abdominal Pain    Constipation       Nurses Notes reviewed and I agree except as noted in the HPI. HISTORY OF PRESENT ILLNESS    Marguerite Cisneros is a 67 y.o. male who presents with lower abdominal pain, and constipation. Patient underwent robotic hernia repair of a right inguinal hernia. Patient notes his last bowel movement was 5 days ago. He has been taking opiate pain medications and stopped yesterday. Denies any nausea or vomiting. Has used Senokot last evening. Denies any fever or chills. REVIEW OF SYSTEMS     Review of Systems   Constitutional:  Negative for chills and fever. Respiratory:  Negative for cough and shortness of breath. Gastrointestinal:  Positive for abdominal pain and constipation. Negative for diarrhea, nausea and vomiting. Genitourinary:  Negative for difficulty urinating, dysuria and flank pain. Musculoskeletal:  Negative for arthralgias and back pain. Skin:  Negative for rash and wound. All other systems reviewed and are negative. PAST MEDICAL HISTORY    has a past medical history of Arthritis, Blockage of coronary artery of heart ([address] and Hyperlipidemia. SURGICAL HISTORY      has a past surgical history that includes Cardiac catheterization (2010) and hernia repair.     CURRENT MEDICATIONS       Previous Medications    CALCIUM CARBONATE 600 MG TABS TABLET    Take 1 tablet by mouth daily    CHOLECALCIFEROL (VITAMIN D3 PO)    Take 1 tablet by mouth daily 5000 IU    COENZYME Q10 (COQ10 PO)    Take 200 mg by mouth daily    CYANOCOBALAMIN (VITAMIN B-12 PO)    Take 1,000 mcg by mouth daily    GLUC-CHONN-MSM-BOSWELLIA-VIT D (GLUCOS CHONDROIT, BOSWELLIA, PO)    Take 2 tablets by mouth daily    HYDROCHLOROTHIAZIDE (HYDRODIURIL) 25 MG TABLET    Take 1 tablet by mouth daily    INULIN (FIBER CHOICE PO)    Take 5 tablets by mouth

## 2023-05-01 NOTE — ED NOTES
Pt. Presents ambulatory to ED with c/o constipation and abdominal pain, pt's last BM was Thursday 4/26. Pt  s/p hernia repair surgery last week. C/o nausea.       Bing Carbajal RN  05/01/23 2077

## 2023-05-24 PROBLEM — Z01.818 PRE-OP EVALUATION: Status: RESOLVED | Noted: 2023-04-24 | Resolved: 2023-05-24

## 2023-06-07 ENCOUNTER — OFFICE VISIT (OUTPATIENT)
Dept: CARDIOLOGY CLINIC | Age: 73
End: 2023-06-07
Payer: MEDICARE

## 2023-06-07 VITALS
DIASTOLIC BLOOD PRESSURE: 74 MMHG | BODY MASS INDEX: 24.73 KG/M2 | SYSTOLIC BLOOD PRESSURE: 131 MMHG | HEIGHT: 73 IN | WEIGHT: 186.6 LBS | HEART RATE: 69 BPM

## 2023-06-07 DIAGNOSIS — R06.09 DOE (DYSPNEA ON EXERTION): ICD-10-CM

## 2023-06-07 DIAGNOSIS — Z87.891 EX-SMOKER: ICD-10-CM

## 2023-06-07 DIAGNOSIS — R94.31 ABNORMAL EKG: Primary | ICD-10-CM

## 2023-06-07 DIAGNOSIS — I49.3 PVC (PREMATURE VENTRICULAR CONTRACTION): ICD-10-CM

## 2023-06-07 DIAGNOSIS — I10 PRIMARY HYPERTENSION: ICD-10-CM

## 2023-06-07 PROCEDURE — 3075F SYST BP GE 130 - 139MM HG: CPT | Performed by: INTERNAL MEDICINE

## 2023-06-07 PROCEDURE — G8420 CALC BMI NORM PARAMETERS: HCPCS | Performed by: INTERNAL MEDICINE

## 2023-06-07 PROCEDURE — 3078F DIAST BP <80 MM HG: CPT | Performed by: INTERNAL MEDICINE

## 2023-06-07 PROCEDURE — 1036F TOBACCO NON-USER: CPT | Performed by: INTERNAL MEDICINE

## 2023-06-07 PROCEDURE — 1123F ACP DISCUSS/DSCN MKR DOCD: CPT | Performed by: INTERNAL MEDICINE

## 2023-06-07 PROCEDURE — G8427 DOCREV CUR MEDS BY ELIG CLIN: HCPCS | Performed by: INTERNAL MEDICINE

## 2023-06-07 PROCEDURE — 3017F COLORECTAL CA SCREEN DOC REV: CPT | Performed by: INTERNAL MEDICINE

## 2023-06-07 PROCEDURE — 99214 OFFICE O/P EST MOD 30 MIN: CPT | Performed by: INTERNAL MEDICINE

## 2023-06-07 RX ORDER — HYDROCHLOROTHIAZIDE 25 MG/1
25 TABLET ORAL DAILY
Qty: 90 TABLET | Refills: 3 | Status: SHIPPED | OUTPATIENT
Start: 2023-06-07

## 2023-06-07 NOTE — PROGRESS NOTES
/stress  Holter 48 hrs-for NSVT and pvcs- revealed - occasional pvcs  The  echo and ex nuc  04/2023 _WNL    Hypertension, on medical treatment. Seems to be under good control. Patient is compliant with medical treatment. D/w the pat the plan of care    Stable and doing well    Discussed use, benefit, and side effects of prescribed medications. All patient questions answered. Pt voiced understanding. Instructed to continue current medications, diet and exercise. Continue risk factor modification and medical management. Patient agreed with treatment plan. Follow up as directed.       RTC   1year    Kelechi Almonte, [facility]

## 2023-11-21 ENCOUNTER — NURSE ONLY (OUTPATIENT)
Dept: FAMILY MEDICINE CLINIC | Age: 73
End: 2023-11-21
Payer: MEDICARE

## 2023-11-21 DIAGNOSIS — Z23 NEED FOR INFLUENZA VACCINATION: Primary | ICD-10-CM

## 2023-11-21 PROCEDURE — 99999 PR OFFICE/OUTPT VISIT,PROCEDURE ONLY: CPT | Performed by: NURSE PRACTITIONER

## 2023-11-21 PROCEDURE — 90694 VACC AIIV4 NO PRSRV 0.5ML IM: CPT | Performed by: NURSE PRACTITIONER

## 2023-11-21 PROCEDURE — G0008 ADMIN INFLUENZA VIRUS VAC: HCPCS | Performed by: NURSE PRACTITIONER

## 2023-11-21 NOTE — PROGRESS NOTES
Immunizations Administered       Name Date Dose Route    Influenza, FLUAD, (age 72 y+), Adjuvanted, 0.5mL 11/21/2023 0.5 mL Intramuscular    Site: Deltoid- Left    Lot: [id number]    NDC: 54747-977-92            VIS GIVEN. CONSENT SIGNED  PATIENT TOLERATED WELL.

## 2023-11-29 ENCOUNTER — OFFICE VISIT (OUTPATIENT)
Dept: FAMILY MEDICINE CLINIC | Age: 73
End: 2023-11-29
Payer: MEDICARE

## 2023-11-29 VITALS
WEIGHT: 190.4 LBS | OXYGEN SATURATION: 96 % | BODY MASS INDEX: 25.23 KG/M2 | RESPIRATION RATE: 20 BRPM | DIASTOLIC BLOOD PRESSURE: 88 MMHG | SYSTOLIC BLOOD PRESSURE: 138 MMHG | TEMPERATURE: 97 F | HEIGHT: 73 IN | HEART RATE: 73 BPM

## 2023-11-29 DIAGNOSIS — I10 PRIMARY HYPERTENSION: Primary | ICD-10-CM

## 2023-11-29 DIAGNOSIS — R10.30 LOWER ABDOMINAL PAIN: ICD-10-CM

## 2023-11-29 PROCEDURE — G8484 FLU IMMUNIZE NO ADMIN: HCPCS | Performed by: NURSE PRACTITIONER

## 2023-11-29 PROCEDURE — 99213 OFFICE O/P EST LOW 20 MIN: CPT | Performed by: NURSE PRACTITIONER

## 2023-11-29 PROCEDURE — 1123F ACP DISCUSS/DSCN MKR DOCD: CPT | Performed by: NURSE PRACTITIONER

## 2023-11-29 PROCEDURE — G8417 CALC BMI ABV UP PARAM F/U: HCPCS | Performed by: NURSE PRACTITIONER

## 2023-11-29 PROCEDURE — 3078F DIAST BP <80 MM HG: CPT | Performed by: NURSE PRACTITIONER

## 2023-11-29 PROCEDURE — G8427 DOCREV CUR MEDS BY ELIG CLIN: HCPCS | Performed by: NURSE PRACTITIONER

## 2023-11-29 PROCEDURE — 1036F TOBACCO NON-USER: CPT | Performed by: NURSE PRACTITIONER

## 2023-11-29 PROCEDURE — 3017F COLORECTAL CA SCREEN DOC REV: CPT | Performed by: NURSE PRACTITIONER

## 2023-11-29 PROCEDURE — 3075F SYST BP GE 130 - 139MM HG: CPT | Performed by: NURSE PRACTITIONER

## 2023-11-29 RX ORDER — METOPROLOL SUCCINATE 25 MG/1
25 TABLET, EXTENDED RELEASE ORAL DAILY
Qty: 30 TABLET | Refills: 11 | Status: SHIPPED | OUTPATIENT
Start: 2023-11-29

## 2023-11-29 ASSESSMENT — ENCOUNTER SYMPTOMS
ABDOMINAL PAIN: 1
EYES NEGATIVE: 1
RESPIRATORY NEGATIVE: 1

## 2023-11-29 NOTE — PROGRESS NOTES
Elsi Caal is a 68 y.o. male whopresents today for :  Chief Complaint   Patient presents with    Bloated     Abdominal pressure/tightness       HPI:     HPI  Pt had hernia surgery earlier this year. For the past few months has noticed int tightness/pressure across his lower abdomen  Patient Active Problem List   Diagnosis    Abnormal EKG    Ex-smoker    LIVE (dyspnea on exertion)    Primary hypertension    PVC (premature ventricular contraction) occasional     Past Medical History:   Diagnosis Date    Arthritis     left and right shoulder    Blockage of coronary artery of heart (HCC) 2010    40-50% blockage NO STENT    Hyperlipidemia       Past Surgical History:   Procedure Laterality Date    CARDIAC CATHETERIZATION      HERNIA REPAIR      right inguinal    HERNIA REPAIR Right 2023    Robotic assisted laparoscopic right iguinal hernia repair with mesh performed by Shelby Sabillon MD at [address] [place] [address] Drive History   Problem Relation Age of Onset    Cancer Paternal Uncle 80        prostate cancer    High Blood Pressure Paternal Uncle     High Cholesterol Paternal Uncle     Heart Disease Paternal Grandfather 58        MI     Social History     Tobacco Use    Smoking status: Former     Packs/day: 0.50     Years: 23.00     Additional pack years: 0.00     Total pack years: 11.50     Types: Cigarettes     Start date:      Quit date: 1991     Years since quittin.3    Smokeless tobacco: Never   Substance Use Topics    Alcohol use:  Yes     Alcohol/week: 2.5 standard drinks of alcohol     Types: 3 drink(s) per week     Comment: daily beer daily      Current Outpatient Medications   Medication Sig Dispense Refill    metoprolol succinate (TOPROL XL) 25 MG extended release tablet Take 1 tablet by mouth daily 30 tablet 11    calcium carbonate 600 MG TABS tablet Take 1 tablet by mouth daily      Gluc-Chonn-MSM-Boswellia-Vit D (GLUCOS CHONDROIT, BOSWELLIA, PO) Take 2 tablets by mouth daily

## 2023-12-08 ENCOUNTER — HOSPITAL ENCOUNTER (OUTPATIENT)
Dept: CT IMAGING | Age: 73
Discharge: HOME | End: 2023-12-08
Payer: MEDICARE

## 2023-12-08 ENCOUNTER — HOSPITAL ENCOUNTER (OUTPATIENT)
Age: 73
Discharge: HOME | End: 2023-12-08
Payer: MEDICARE

## 2023-12-08 ENCOUNTER — TELEPHONE (OUTPATIENT)
Dept: FAMILY MEDICINE CLINIC | Age: 73
End: 2023-12-08

## 2023-12-08 DIAGNOSIS — Z12.5 SCREENING FOR PROSTATE CANCER: ICD-10-CM

## 2023-12-08 DIAGNOSIS — I10 PRIMARY HYPERTENSION: Primary | ICD-10-CM

## 2023-12-08 DIAGNOSIS — R10.30 LOWER ABDOMINAL PAIN: ICD-10-CM

## 2023-12-08 DIAGNOSIS — N30.90 CYSTITIS: ICD-10-CM

## 2023-12-08 LAB
CREAT BLD-MCNC: 0.8 MG/DL (ref 0.5–1.2)
GFR SERPL CREATININE-BSD FRML MDRD: > 60 ML/MIN/1.73M2

## 2023-12-08 PROCEDURE — 82565 ASSAY OF CREATININE: CPT

## 2023-12-08 PROCEDURE — 74178 CT ABD&PLV WO CNTR FLWD CNTR: CPT

## 2023-12-08 PROCEDURE — 6360000004 HC RX CONTRAST MEDICATION: Performed by: NURSE PRACTITIONER

## 2023-12-08 RX ADMIN — IOPAMIDOL 100 ML: 755 INJECTION, SOLUTION INTRAVENOUS at 09:08

## 2023-12-08 NOTE — TELEPHONE ENCOUNTER
Please call pt. His ct scan showed signs of an inflamed bladder. Could be from prostate issues or infection. I recommend to get labs which include a psa and also to get a urine check.   His symptoms he is having do correlate with coming from his bladder

## 2023-12-11 ENCOUNTER — NURSE ONLY (OUTPATIENT)
Dept: LAB | Age: 73
End: 2023-12-11

## 2023-12-11 DIAGNOSIS — N30.90 CYSTITIS: ICD-10-CM

## 2023-12-11 DIAGNOSIS — I10 PRIMARY HYPERTENSION: ICD-10-CM

## 2023-12-11 DIAGNOSIS — Z12.5 SCREENING FOR PROSTATE CANCER: ICD-10-CM

## 2023-12-11 LAB
ALBUMIN SERPL BCG-MCNC: 4.3 G/DL (ref 3.5–5.1)
ALP SERPL-CCNC: 61 U/L (ref 38–126)
ALT SERPL W/O P-5'-P-CCNC: 14 U/L (ref 11–66)
ANION GAP SERPL CALC-SCNC: 10 MEQ/L (ref 8–16)
AST SERPL-CCNC: 16 U/L (ref 5–40)
BASOPHILS ABSOLUTE: 0 THOU/MM3 (ref 0–0.1)
BASOPHILS NFR BLD AUTO: 0.8 %
BILIRUB SERPL-MCNC: 0.6 MG/DL (ref 0.3–1.2)
BILIRUB UR QL STRIP.AUTO: NEGATIVE
BUN SERPL-MCNC: 16 MG/DL (ref 7–22)
CALCIUM SERPL-MCNC: 9.6 MG/DL (ref 8.5–10.5)
CHARACTER UR: CLEAR
CHLORIDE SERPL-SCNC: 104 MEQ/L (ref 98–111)
CHOLEST SERPL-MCNC: 229 MG/DL (ref 100–199)
CO2 SERPL-SCNC: 27 MEQ/L (ref 23–33)
COLOR: YELLOW
CREAT SERPL-MCNC: 0.8 MG/DL (ref 0.4–1.2)
DEPRECATED RDW RBC AUTO: 44.7 FL (ref 35–45)
EOSINOPHIL NFR BLD AUTO: 3.1 %
EOSINOPHILS ABSOLUTE: 0.2 THOU/MM3 (ref 0–0.4)
ERYTHROCYTE [DISTWIDTH] IN BLOOD BY AUTOMATED COUNT: 11.8 % (ref 11.5–14.5)
GFR SERPL CREATININE-BSD FRML MDRD: > 60 ML/MIN/1.73M2
GLUCOSE SERPL-MCNC: 96 MG/DL (ref 70–108)
GLUCOSE UR QL STRIP.AUTO: NEGATIVE MG/DL
HCT VFR BLD AUTO: 44.4 % (ref 42–52)
HDLC SERPL-MCNC: 70 MG/DL
HGB BLD-MCNC: 14.6 GM/DL (ref 14–18)
HGB UR QL STRIP.AUTO: NEGATIVE
IMM GRANULOCYTES # BLD AUTO: 0.01 THOU/MM3 (ref 0–0.07)
IMM GRANULOCYTES NFR BLD AUTO: 0.2 %
KETONES UR QL STRIP.AUTO: NEGATIVE
LDLC SERPL CALC-MCNC: 137 MG/DL
LYMPHOCYTES ABSOLUTE: 1.4 THOU/MM3 (ref 1–4.8)
LYMPHOCYTES NFR BLD AUTO: 28.9 %
MCH RBC QN AUTO: 33.7 PG (ref 26–33)
MCHC RBC AUTO-ENTMCNC: 32.9 GM/DL (ref 32.2–35.5)
MCV RBC AUTO: 102.5 FL (ref 80–94)
MONOCYTES ABSOLUTE: 0.7 THOU/MM3 (ref 0.4–1.3)
MONOCYTES NFR BLD AUTO: 13.5 %
NEUTROPHILS NFR BLD AUTO: 53.5 %
NITRITE UR QL STRIP: NEGATIVE
NRBC BLD AUTO-RTO: 0 /100 WBC
PH UR STRIP.AUTO: 6 [PH] (ref 5–9)
PLATELET # BLD AUTO: 222 THOU/MM3 (ref 130–400)
PMV BLD AUTO: 12.2 FL (ref 9.4–12.4)
POTASSIUM SERPL-SCNC: 4.1 MEQ/L (ref 3.5–5.2)
PROT SERPL-MCNC: 6.7 G/DL (ref 6.1–8)
PROT UR STRIP.AUTO-MCNC: NEGATIVE MG/DL
PSA SERPL-MCNC: 2.28 NG/ML (ref 0–1)
RBC # BLD AUTO: 4.33 MILL/MM3 (ref 4.7–6.1)
SEGMENTED NEUTROPHILS ABSOLUTE COUNT: 2.6 THOU/MM3 (ref 1.8–7.7)
SODIUM SERPL-SCNC: 141 MEQ/L (ref 135–145)
SP GR UR REFRACT.AUTO: 1.02 (ref 1–1.03)
TRIGL SERPL-MCNC: 108 MG/DL (ref 0–199)
UROBILINOGEN, URINE: 0.2 EU/DL (ref 0–1)
WBC # BLD AUTO: 4.9 THOU/MM3 (ref 4.8–10.8)
WBC #/AREA URNS HPF: NEGATIVE /[HPF]

## 2023-12-12 ENCOUNTER — TELEPHONE (OUTPATIENT)
Dept: FAMILY MEDICINE CLINIC | Age: 73
End: 2023-12-12

## 2023-12-12 DIAGNOSIS — R35.1 BENIGN PROSTATIC HYPERPLASIA WITH NOCTURIA: Primary | ICD-10-CM

## 2023-12-12 DIAGNOSIS — N40.1 BENIGN PROSTATIC HYPERPLASIA WITH NOCTURIA: Primary | ICD-10-CM

## 2023-12-12 RX ORDER — CIPROFLOXACIN 500 MG/1
500 TABLET, FILM COATED ORAL 2 TIMES DAILY
Qty: 28 TABLET | Refills: 0 | Status: SHIPPED | OUTPATIENT
Start: 2023-12-12 | End: 2023-12-26

## 2023-12-12 NOTE — TELEPHONE ENCOUNTER
----- Message from Shannen Orr, [address] sent at 12/12/2023  8:33 AM EST -----  Pt states he always gets up at least once a night , sometimes twice a night. He said he has the tightness in his pelvis area still.

## 2023-12-12 NOTE — TELEPHONE ENCOUNTER
I am going to send in an antibiotic as he may have a degree of a prostate infection and refer to urology for evaluation

## 2023-12-13 NOTE — TELEPHONE ENCOUNTER
Left message for patient to call office back. Pt was informed of results and recommendations. Pt has set up manometry for Wednesday.

## 2024-01-17 ENCOUNTER — OFFICE VISIT (OUTPATIENT)
Dept: UROLOGY | Age: 74
End: 2024-01-17
Payer: MEDICARE

## 2024-01-17 VITALS — WEIGHT: 190 LBS | RESPIRATION RATE: 16 BRPM | HEIGHT: 73 IN | BODY MASS INDEX: 25.18 KG/M2

## 2024-01-17 DIAGNOSIS — N41.1 CHRONIC PROSTATITIS: Primary | ICD-10-CM

## 2024-01-17 DIAGNOSIS — R10.30 LOWER ABDOMINAL PAIN: ICD-10-CM

## 2024-01-17 PROCEDURE — G8484 FLU IMMUNIZE NO ADMIN: HCPCS | Performed by: UROLOGY

## 2024-01-17 PROCEDURE — 3017F COLORECTAL CA SCREEN DOC REV: CPT | Performed by: UROLOGY

## 2024-01-17 PROCEDURE — 99204 OFFICE O/P NEW MOD 45 MIN: CPT | Performed by: UROLOGY

## 2024-01-17 PROCEDURE — 1123F ACP DISCUSS/DSCN MKR DOCD: CPT | Performed by: UROLOGY

## 2024-01-17 PROCEDURE — G8417 CALC BMI ABV UP PARAM F/U: HCPCS | Performed by: UROLOGY

## 2024-01-17 PROCEDURE — 1036F TOBACCO NON-USER: CPT | Performed by: UROLOGY

## 2024-01-17 PROCEDURE — G8427 DOCREV CUR MEDS BY ELIG CLIN: HCPCS | Performed by: UROLOGY

## 2024-01-17 RX ORDER — DOXYCYCLINE HYCLATE 100 MG
100 TABLET ORAL 2 TIMES DAILY
Qty: 28 TABLET | Refills: 0 | Status: SHIPPED | OUTPATIENT
Start: 2024-01-17 | End: 2024-01-31

## 2024-01-17 NOTE — PROGRESS NOTES
Dr. José Leyva MD  [facility]  Urology Clinic  New Patient Visit      Patient:  Jens Elkins  YOB: 1950  Date: 1/17/2024    HISTORY OF PRESENT ILLNESS:   The patient is a 73 y.o. male who presents today for evaluation of the following problem(s): lower abdomina tightness starting 3 months after hernia repair.     Overall the problem(s) : are worsening.  Associated Symptoms: No dysuria, gross hematuria.  Pain Severity: 0/10    Summary of old records:   None    Imaging/Labs reviewed during today's visit:  I have independently reviewed and verified the following films during today's visit.  CT scan no abnormalities.     Last several PSA's:  Lab Results   Component Value Date    PSA 2.28 (H) 12/11/2023    PSA 1.10 01/21/2015       Last total testosterone:  No results found for: \"TESTOSTERONE\"    Urinalysis today:  No results found for this visit on 01/17/24.      Last BUN and creatinine:  Lab Results   Component Value Date    BUN 16 12/11/2023     Lab Results   Component Value Date    CREATININE 0.8 12/11/2023       Imaging Reviewed during this Office Visit:   (results were independently reviewed by physician and radiology report verified)    PAST MEDICAL, FAMILY AND SOCIAL HISTORY:  Past Medical History:   Diagnosis Date    Arthritis     left and right shoulder    Blockage of coronary artery of heart (HCC) 01/01/2010    40-50% blockage NO STENT    Hyperlipidemia      Past Surgical History:   Procedure Laterality Date    CARDIAC CATHETERIZATION  2010    HERNIA REPAIR      right inguinal    HERNIA REPAIR Right 4/28/2023    Robotic assisted laparoscopic right iguinal hernia repair with mesh performed by Sourav Baptiste MD at [facility] OR     Family History   Problem Relation Age of Onset    Cancer Paternal Uncle 80        prostate cancer    High Blood Pressure Paternal Uncle     High Cholesterol Paternal Uncle     Heart Disease Paternal Grandfather 62        MI     No outpatient medications have

## 2024-03-13 ENCOUNTER — OFFICE VISIT (OUTPATIENT)
Dept: FAMILY MEDICINE CLINIC | Age: 74
End: 2024-03-13

## 2024-03-13 VITALS
TEMPERATURE: 97.8 F | RESPIRATION RATE: 14 BRPM | HEIGHT: 73 IN | HEART RATE: 60 BPM | BODY MASS INDEX: 25.05 KG/M2 | WEIGHT: 189 LBS | SYSTOLIC BLOOD PRESSURE: 130 MMHG | DIASTOLIC BLOOD PRESSURE: 80 MMHG

## 2024-03-13 DIAGNOSIS — Z23 NEED FOR PNEUMOCOCCAL 20-VALENT CONJUGATE VACCINATION: ICD-10-CM

## 2024-03-13 DIAGNOSIS — Z00.00 INITIAL MEDICARE ANNUAL WELLNESS VISIT: Primary | ICD-10-CM

## 2024-03-13 ASSESSMENT — PATIENT HEALTH QUESTIONNAIRE - PHQ9
1. LITTLE INTEREST OR PLEASURE IN DOING THINGS: 0
SUM OF ALL RESPONSES TO PHQ9 QUESTIONS 1 & 2: 0
SUM OF ALL RESPONSES TO PHQ QUESTIONS 1-9: 0
2. FEELING DOWN, DEPRESSED OR HOPELESS: 0
SUM OF ALL RESPONSES TO PHQ QUESTIONS 1-9: 0

## 2024-03-13 ASSESSMENT — LIFESTYLE VARIABLES
HOW OFTEN DO YOU HAVE A DRINK CONTAINING ALCOHOL: MONTHLY OR LESS
HOW MANY STANDARD DRINKS CONTAINING ALCOHOL DO YOU HAVE ON A TYPICAL DAY: 1 OR 2

## 2024-03-13 NOTE — PATIENT INSTRUCTIONS
liability for your use of this information.      Personalized Preventive Plan for Jens Elkins - 3/13/2024  Medicare offers a range of preventive health benefits. Some of the tests and screenings are paid in full while other may be subject to a deductible, co-insurance, and/or copay.    Some of these benefits include a comprehensive review of your medical history including lifestyle, illnesses that may run in your family, and various assessments and screenings as appropriate.    After reviewing your medical record and screening and assessments performed today your provider may have ordered immunizations, labs, imaging, and/or referrals for you.  A list of these orders (if applicable) as well as your Preventive Care list are included within your After Visit Summary for your review.    Other Preventive Recommendations:    A preventive eye exam performed by an eye specialist is recommended every 1-2 years to screen for glaucoma; cataracts, macular degeneration, and other eye disorders.  A preventive dental visit is recommended every 6 months.  Try to get at least 150 minutes of exercise per week or 10,000 steps per day on a pedometer .  Order or download the FREE \"Exercise & Physical Activity: Your Everyday Guide\" from [employer]. Call 1-709.245.7554 or search [employer] online.  You need 8210-9788 mg of calcium and 9925-3363 IU of vitamin D per day. It is possible to meet your calcium requirement with diet alone, but a vitamin D supplement is usually necessary to meet this goal.  When exposed to the sun, use a sunscreen that protects against both UVA and UVB radiation with an SPF of 30 or greater. Reapply every 2 to 3 hours or after sweating, drying off with a towel, or swimming.  Always wear a seat belt when traveling in a car. Always wear a helmet when riding a bicycle or motorcycle.

## 2024-03-13 NOTE — PROGRESS NOTES
Medicare Annual Wellness Visit    Jens Elkins is here for Medicare AWV    Assessment & Plan   Initial Medicare annual wellness visit  Need for pneumococcal 20-valent conjugate vaccination  -     Pneumococcal, PCV20, PREVNAR 20, (age 6w+), IM, PF    Recommendations for Preventive Services Due: see orders and patient instructions/AVS.  Recommended screening schedule for the next 5-10 years is provided to the patient in written form: see Patient Instructions/AVS.     No follow-ups on file.     Subjective   The following acute and/or chronic problems were also addressed today:  Reviewed recent labs and bp meds    Patient's complete Health Risk Assessment and screening values have been reviewed and are found in Flowsheets. The following problems were reviewed today and where indicated follow up appointments were made and/or referrals ordered.    Positive Risk Factor Screenings with Interventions:                   Vision Screen:  Do you have difficulty driving, watching TV, or doing any of your daily activities because of your eyesight?: No  Have you had an eye exam within the past year?: (!) No  No results found.    Interventions:   Patient encouraged to make appointment with their eye specialist                    Objective   Vitals:    03/13/24 0950   BP: 130/80   Site: Left Upper Arm   Position: Sitting   Cuff Size: Medium Adult   Pulse: 60   Resp: 14   Temp: 97.8 °F (36.6 °C)   TempSrc: Temporal   Weight: 85.7 kg (189 lb)   Height: 1.854 m (6' 1\")      Body mass index is 24.94 kg/m².      General Appearance: alert and oriented to person, place and time, well developed and well- nourished, in no acute distress  Skin: warm and dry, no rash or erythema  Head: normocephalic and atraumatic  Eyes: pupils equal, round, and reactive to light, extraocular eye movements intact, conjunctivae normal  ENT: tympanic membrane, external ear and ear canal normal bilaterally, nose without deformity, nasal mucosa and turbinates

## 2024-03-13 NOTE — PROGRESS NOTES
Immunizations Administered       Name Date Dose Route    Pneumococcal, PCV20, PREVNAR 20, (age 6w+), IM, 0.5mL 3/13/2024 0.5 mL Intramuscular    Site: Deltoid- Left    Lot: [id number]    NDC: 8736-8369-51            VIS GIVEN.  CONSENT SIGNED  PATIENT TOLERATED WELL.

## 2024-06-17 ENCOUNTER — TELEPHONE (OUTPATIENT)
Dept: FAMILY MEDICINE CLINIC | Age: 74
End: 2024-06-17

## 2024-06-17 RX ORDER — SILDENAFIL 50 MG/1
50 TABLET, FILM COATED ORAL DAILY PRN
Qty: 10 TABLET | Refills: 0 | Status: SHIPPED | OUTPATIENT
Start: 2024-06-17

## 2024-06-17 RX ORDER — SILDENAFIL 50 MG/1
50 TABLET, FILM COATED ORAL DAILY PRN
Qty: 10 TABLET | Refills: 0 | Status: SHIPPED | OUTPATIENT
Start: 2024-06-17 | End: 2024-06-17 | Stop reason: SDUPTHER

## 2024-06-17 NOTE — TELEPHONE ENCOUNTER
Pt requesting generic Viagra to be send to [facility].  He says he has gotten it before but I could not find it anywhere on the list to do a refill.     Fax printed rx to  [phone].

## 2024-07-11 ENCOUNTER — TELEPHONE (OUTPATIENT)
Dept: FAMILY MEDICINE CLINIC | Age: 74
End: 2024-07-11

## 2024-07-11 RX ORDER — DICLOFENAC SODIUM 75 MG/1
75 TABLET, DELAYED RELEASE ORAL 2 TIMES DAILY
Qty: 60 TABLET | Refills: 0 | Status: SHIPPED | OUTPATIENT
Start: 2024-07-11

## 2024-07-11 RX ORDER — TIZANIDINE 4 MG/1
4 TABLET ORAL 3 TIMES DAILY PRN
Qty: 30 TABLET | Refills: 0 | Status: SHIPPED | OUTPATIENT
Start: 2024-07-11

## 2024-07-11 NOTE — TELEPHONE ENCOUNTER
Patient left message c/o right hip/leg pain.    Spoke with patient.  Patient states the pain started 10 days ago.  No known injury.  Patient states the pain starts in hip and radiates down leg.  Patient asking for muscle relaxer/anti-inflammatory.  Patient has tried OTC ibuprofen/tylenol      [facility]

## 2024-07-18 ENCOUNTER — TELEPHONE (OUTPATIENT)
Dept: FAMILY MEDICINE CLINIC | Age: 74
End: 2024-07-18

## 2024-07-18 NOTE — TELEPHONE ENCOUNTER
Patient called asking for an appt for his leg/hip pain and tingling. An appt was given for tomorrow.

## 2024-07-19 ENCOUNTER — OFFICE VISIT (OUTPATIENT)
Dept: FAMILY MEDICINE CLINIC | Age: 74
End: 2024-07-19
Payer: MEDICARE

## 2024-07-19 VITALS
OXYGEN SATURATION: 100 % | BODY MASS INDEX: 24.63 KG/M2 | WEIGHT: 185.8 LBS | DIASTOLIC BLOOD PRESSURE: 78 MMHG | RESPIRATION RATE: 16 BRPM | HEART RATE: 62 BPM | TEMPERATURE: 98 F | SYSTOLIC BLOOD PRESSURE: 134 MMHG | HEIGHT: 73 IN

## 2024-07-19 DIAGNOSIS — G57.01 PIRIFORMIS SYNDROME OF RIGHT SIDE: Primary | ICD-10-CM

## 2024-07-19 DIAGNOSIS — M54.31 SCIATICA OF RIGHT SIDE: ICD-10-CM

## 2024-07-19 PROCEDURE — G8427 DOCREV CUR MEDS BY ELIG CLIN: HCPCS | Performed by: NURSE PRACTITIONER

## 2024-07-19 PROCEDURE — 3017F COLORECTAL CA SCREEN DOC REV: CPT | Performed by: NURSE PRACTITIONER

## 2024-07-19 PROCEDURE — G8420 CALC BMI NORM PARAMETERS: HCPCS | Performed by: NURSE PRACTITIONER

## 2024-07-19 PROCEDURE — 99213 OFFICE O/P EST LOW 20 MIN: CPT | Performed by: NURSE PRACTITIONER

## 2024-07-19 PROCEDURE — 1036F TOBACCO NON-USER: CPT | Performed by: NURSE PRACTITIONER

## 2024-07-19 PROCEDURE — 1123F ACP DISCUSS/DSCN MKR DOCD: CPT | Performed by: NURSE PRACTITIONER

## 2024-07-19 PROCEDURE — 3078F DIAST BP <80 MM HG: CPT | Performed by: NURSE PRACTITIONER

## 2024-07-19 PROCEDURE — 3075F SYST BP GE 130 - 139MM HG: CPT | Performed by: NURSE PRACTITIONER

## 2024-07-19 RX ORDER — PREDNISONE 5 MG/1
TABLET ORAL
Qty: 45 TABLET | Refills: 0 | Status: SHIPPED | OUTPATIENT
Start: 2024-07-19 | End: 2024-07-29

## 2024-07-19 RX ORDER — TIZANIDINE 4 MG/1
4 TABLET ORAL 3 TIMES DAILY PRN
Qty: 30 TABLET | Refills: 0 | Status: SHIPPED | OUTPATIENT
Start: 2024-07-19

## 2024-07-19 SDOH — ECONOMIC STABILITY: FOOD INSECURITY: WITHIN THE PAST 12 MONTHS, THE FOOD YOU BOUGHT JUST DIDN'T LAST AND YOU DIDN'T HAVE MONEY TO GET MORE.: NEVER TRUE

## 2024-07-19 SDOH — ECONOMIC STABILITY: HOUSING INSECURITY
IN THE LAST 12 MONTHS, WAS THERE A TIME WHEN YOU DID NOT HAVE A STEADY PLACE TO SLEEP OR SLEPT IN A SHELTER (INCLUDING NOW)?: NO

## 2024-07-19 SDOH — ECONOMIC STABILITY: FOOD INSECURITY: WITHIN THE PAST 12 MONTHS, YOU WORRIED THAT YOUR FOOD WOULD RUN OUT BEFORE YOU GOT MONEY TO BUY MORE.: NEVER TRUE

## 2024-07-19 SDOH — ECONOMIC STABILITY: INCOME INSECURITY: HOW HARD IS IT FOR YOU TO PAY FOR THE VERY BASICS LIKE FOOD, HOUSING, MEDICAL CARE, AND HEATING?: NOT HARD AT ALL

## 2024-07-19 ASSESSMENT — ENCOUNTER SYMPTOMS
GASTROINTESTINAL NEGATIVE: 1
EYES NEGATIVE: 1
RESPIRATORY NEGATIVE: 1

## 2024-07-19 NOTE — PROGRESS NOTES
vaccine  Aged Out    Hepatitis B vaccine  Aged Out    Hib vaccine  Aged Out    Polio vaccine  Aged Out    Meningococcal (ACWY) vaccine  Aged Out       :     Review of Systems   Constitutional: Negative.    HENT: Negative.     Eyes: Negative.    Respiratory: Negative.     Cardiovascular: Negative.    Gastrointestinal: Negative.    Musculoskeletal:  Positive for myalgias.   Skin: Negative.    Neurological:  Positive for numbness.       Objective:     Vitals:    07/19/24 0855   BP: 134/78   Site: Left Upper Arm   Position: Sitting   Cuff Size: Medium Adult   Pulse: 62   Resp: 16   Temp: 98 °F (36.7 °C)   TempSrc: Temporal   SpO2: 100%   Weight: 84.3 kg (185 lb 12.8 oz)   Height: 1.854 m (6' 0.99\")       Physical Exam  Constitutional:       Appearance: He is well-developed.   HENT:      Head: Normocephalic.      Right Ear: Tympanic membrane and external ear normal.      Left Ear: Tympanic membrane and external ear normal.      Nose: Nose normal.   Cardiovascular:      Rate and Rhythm: Normal rate and regular rhythm.      Heart sounds: Normal heart sounds. No murmur heard.     No friction rub. No gallop.   Pulmonary:      Effort: Pulmonary effort is normal.      Breath sounds: Normal breath sounds. No wheezing or rales.   Abdominal:      General: Bowel sounds are normal.      Palpations: Abdomen is soft.      Tenderness: There is no abdominal tenderness. There is no guarding.   Musculoskeletal:         General: Normal range of motion.      Cervical back: Normal range of motion and neck supple.        Back:    Lymphadenopathy:      Cervical: No cervical adenopathy.   Skin:     General: Skin is warm.   Neurological:      Mental Status: He is alert and oriented to person, place, and time.      Deep Tendon Reflexes: Reflexes are normal and symmetric.           :      Diagnosis Orders   1. Piriformis syndrome of right side  tiZANidine (ZANAFLEX) 4 MG tablet    predniSONE (DELTASONE) 5 MG tablet    External Referral To

## 2024-07-29 DIAGNOSIS — M54.31 SCIATICA OF RIGHT SIDE: ICD-10-CM

## 2024-07-29 DIAGNOSIS — G57.01 PIRIFORMIS SYNDROME OF RIGHT SIDE: ICD-10-CM

## 2024-07-29 RX ORDER — TIZANIDINE 4 MG/1
4 TABLET ORAL 3 TIMES DAILY PRN
Qty: 30 TABLET | Refills: 1 | Status: SHIPPED | OUTPATIENT
Start: 2024-07-29

## 2024-07-29 NOTE — TELEPHONE ENCOUNTER
Last visit- 7/19/2024  Next visit- Visit date not found    Requested Prescriptions     Pending Prescriptions Disp Refills    tiZANidine (ZANAFLEX) 4 MG tablet 30 tablet 1     Sig: Take 1 tablet by mouth 3 times daily as needed (pain)

## 2024-09-20 ENCOUNTER — OFFICE VISIT (OUTPATIENT)
Dept: CARDIOLOGY CLINIC | Age: 74
End: 2024-09-20
Payer: MEDICARE

## 2024-09-20 VITALS
DIASTOLIC BLOOD PRESSURE: 85 MMHG | HEIGHT: 73 IN | HEART RATE: 79 BPM | WEIGHT: 183.4 LBS | SYSTOLIC BLOOD PRESSURE: 139 MMHG | BODY MASS INDEX: 24.31 KG/M2

## 2024-09-20 DIAGNOSIS — R06.09 DOE (DYSPNEA ON EXERTION): ICD-10-CM

## 2024-09-20 DIAGNOSIS — I49.3 PVC (PREMATURE VENTRICULAR CONTRACTION): ICD-10-CM

## 2024-09-20 DIAGNOSIS — I10 PRIMARY HYPERTENSION: Primary | ICD-10-CM

## 2024-09-20 DIAGNOSIS — R94.31 ABNORMAL EKG: ICD-10-CM

## 2024-09-20 PROCEDURE — G8427 DOCREV CUR MEDS BY ELIG CLIN: HCPCS | Performed by: INTERNAL MEDICINE

## 2024-09-20 PROCEDURE — G8420 CALC BMI NORM PARAMETERS: HCPCS | Performed by: INTERNAL MEDICINE

## 2024-09-20 PROCEDURE — 1036F TOBACCO NON-USER: CPT | Performed by: INTERNAL MEDICINE

## 2024-09-20 PROCEDURE — 99214 OFFICE O/P EST MOD 30 MIN: CPT | Performed by: INTERNAL MEDICINE

## 2024-09-20 PROCEDURE — 1123F ACP DISCUSS/DSCN MKR DOCD: CPT | Performed by: INTERNAL MEDICINE

## 2024-09-20 PROCEDURE — 3017F COLORECTAL CA SCREEN DOC REV: CPT | Performed by: INTERNAL MEDICINE

## 2024-09-20 PROCEDURE — 93000 ELECTROCARDIOGRAM COMPLETE: CPT | Performed by: INTERNAL MEDICINE

## 2024-09-20 PROCEDURE — 3075F SYST BP GE 130 - 139MM HG: CPT | Performed by: INTERNAL MEDICINE

## 2024-09-20 PROCEDURE — 3079F DIAST BP 80-89 MM HG: CPT | Performed by: INTERNAL MEDICINE

## 2024-11-26 ENCOUNTER — NURSE ONLY (OUTPATIENT)
Dept: FAMILY MEDICINE CLINIC | Age: 74
End: 2024-11-26
Payer: MEDICARE

## 2024-11-26 DIAGNOSIS — Z23 NEED FOR IMMUNIZATION AGAINST INFLUENZA: Primary | ICD-10-CM

## 2024-11-26 PROCEDURE — 90653 IIV ADJUVANT VACCINE IM: CPT | Performed by: NURSE PRACTITIONER

## 2024-11-26 PROCEDURE — G0008 ADMIN INFLUENZA VIRUS VAC: HCPCS | Performed by: NURSE PRACTITIONER

## 2024-11-26 NOTE — PROGRESS NOTES
Immunizations Administered       Name Date Dose Route    Influenza, FLUAD, (age 65 y+), IM, Trivalent PF, 0.5mL 11/26/2024 0.5 mL Intramuscular    Site: Deltoid- Left    Lot: [id number]    NDC: 10323-084-55        Patient tolerated well.  Kari Muir CMA.        Vaccine Information Sheet, \"Influenza - Inactivated\"  given to Jens Elkins, or parent/legal guardian of  Jens Elkins and verbalized understanding.    Patient responses:    Have you ever had a reaction to a flu vaccine? No  Do you have an allergy to eggs, neomycin or polymixin?  No  Do you have an allergy to Thimerosal, contact lens solution, or Merthiolate? No  Have you ever had Guillian Mcalister Syndrome?  No  Do you have any current illness?  No  Do you have a temperature above 100 degrees? No  Are you pregnant? No  If pregnant, permission obtained from physician? No  Do you have an active neurological disorder? No      Flu vaccine given per order. Please see immunization tab.

## 2024-12-13 DIAGNOSIS — I10 PRIMARY HYPERTENSION: ICD-10-CM

## 2024-12-13 NOTE — TELEPHONE ENCOUNTER
Last visit- 7/19/2024  Next visit- Visit date not found    Requested Prescriptions     Pending Prescriptions Disp Refills    metoprolol succinate (TOPROL XL) 25 MG extended release tablet [Pharmacy Med Name: METOPROLOL SUCCINATE ER 25MG ER TABLET ER 24HR] 30 tablet 11     Sig: TAKE ONE TABLET DAILY, BY MOUTH.

## 2024-12-15 RX ORDER — METOPROLOL SUCCINATE 25 MG/1
25 TABLET, EXTENDED RELEASE ORAL DAILY
Qty: 30 TABLET | Refills: 11 | Status: SHIPPED | OUTPATIENT
Start: 2024-12-15

## 2024-12-20 DIAGNOSIS — I10 PRIMARY HYPERTENSION: ICD-10-CM

## 2024-12-20 RX ORDER — METOPROLOL SUCCINATE 25 MG/1
25 TABLET, EXTENDED RELEASE ORAL DAILY
Qty: 90 TABLET | Refills: 1 | Status: SHIPPED | OUTPATIENT
Start: 2024-12-20

## 2024-12-20 NOTE — TELEPHONE ENCOUNTER
Last visit- 7/19/2024  Next visit- Visit date not found    Requested Prescriptions     Pending Prescriptions Disp Refills    metoprolol succinate (TOPROL XL) 25 MG extended release tablet 90 tablet 1     Sig: Take 1 tablet by mouth daily

## 2025-05-07 ENCOUNTER — OFFICE VISIT (OUTPATIENT)
Dept: FAMILY MEDICINE CLINIC | Age: 75
End: 2025-05-07
Payer: MEDICARE

## 2025-05-07 VITALS
DIASTOLIC BLOOD PRESSURE: 74 MMHG | SYSTOLIC BLOOD PRESSURE: 110 MMHG | HEIGHT: 73 IN | RESPIRATION RATE: 16 BRPM | BODY MASS INDEX: 24.52 KG/M2 | TEMPERATURE: 97.8 F | WEIGHT: 185 LBS | HEART RATE: 68 BPM

## 2025-05-07 DIAGNOSIS — Z12.5 SCREENING FOR PROSTATE CANCER: ICD-10-CM

## 2025-05-07 DIAGNOSIS — Z00.00 MEDICARE ANNUAL WELLNESS VISIT, SUBSEQUENT: Primary | ICD-10-CM

## 2025-05-07 DIAGNOSIS — I10 PRIMARY HYPERTENSION: ICD-10-CM

## 2025-05-07 PROCEDURE — G0439 PPPS, SUBSEQ VISIT: HCPCS | Performed by: NURSE PRACTITIONER

## 2025-05-07 PROCEDURE — 3078F DIAST BP <80 MM HG: CPT | Performed by: NURSE PRACTITIONER

## 2025-05-07 PROCEDURE — 3017F COLORECTAL CA SCREEN DOC REV: CPT | Performed by: NURSE PRACTITIONER

## 2025-05-07 PROCEDURE — 1123F ACP DISCUSS/DSCN MKR DOCD: CPT | Performed by: NURSE PRACTITIONER

## 2025-05-07 PROCEDURE — 3074F SYST BP LT 130 MM HG: CPT | Performed by: NURSE PRACTITIONER

## 2025-05-07 PROCEDURE — 1159F MED LIST DOCD IN RCRD: CPT | Performed by: NURSE PRACTITIONER

## 2025-05-07 RX ORDER — SILDENAFIL 50 MG/1
50 TABLET, FILM COATED ORAL DAILY PRN
Qty: 10 TABLET | Refills: 3 | Status: SHIPPED | OUTPATIENT
Start: 2025-05-07

## 2025-05-07 RX ORDER — HYDROCHLOROTHIAZIDE 25 MG/1
25 TABLET ORAL DAILY
Qty: 90 TABLET | Refills: 3 | Status: SHIPPED | OUTPATIENT
Start: 2025-05-07

## 2025-05-07 RX ORDER — METOPROLOL SUCCINATE 25 MG/1
25 TABLET, EXTENDED RELEASE ORAL DAILY
Qty: 90 TABLET | Refills: 3 | Status: SHIPPED | OUTPATIENT
Start: 2025-05-07

## 2025-05-07 SDOH — ECONOMIC STABILITY: FOOD INSECURITY: WITHIN THE PAST 12 MONTHS, YOU WORRIED THAT YOUR FOOD WOULD RUN OUT BEFORE YOU GOT MONEY TO BUY MORE.: NEVER TRUE

## 2025-05-07 SDOH — ECONOMIC STABILITY: FOOD INSECURITY: WITHIN THE PAST 12 MONTHS, THE FOOD YOU BOUGHT JUST DIDN'T LAST AND YOU DIDN'T HAVE MONEY TO GET MORE.: NEVER TRUE

## 2025-05-07 ASSESSMENT — LIFESTYLE VARIABLES
HOW OFTEN DURING THE LAST YEAR HAVE YOU HAD A FEELING OF GUILT OR REMORSE AFTER DRINKING: NEVER
HOW OFTEN DURING THE LAST YEAR HAVE YOU BEEN UNABLE TO REMEMBER WHAT HAPPENED THE NIGHT BEFORE BECAUSE YOU HAD BEEN DRINKING: NEVER
HOW OFTEN DURING THE LAST YEAR HAVE YOU FOUND THAT YOU WERE NOT ABLE TO STOP DRINKING ONCE YOU HAD STARTED: NEVER
HOW MANY STANDARD DRINKS CONTAINING ALCOHOL DO YOU HAVE ON A TYPICAL DAY: 3 OR 4
HAS A RELATIVE, FRIEND, DOCTOR, OR ANOTHER HEALTH PROFESSIONAL EXPRESSED CONCERN ABOUT YOUR DRINKING OR SUGGESTED YOU CUT DOWN: NO
HAVE YOU OR SOMEONE ELSE BEEN INJURED AS A RESULT OF YOUR DRINKING: NO
HOW OFTEN DURING THE LAST YEAR HAVE YOU NEEDED AN ALCOHOLIC DRINK FIRST THING IN THE MORNING TO GET YOURSELF GOING AFTER A NIGHT OF HEAVY DRINKING: NEVER
HOW OFTEN DURING THE LAST YEAR HAVE YOU FAILED TO DO WHAT WAS NORMALLY EXPECTED FROM YOU BECAUSE OF DRINKING: NEVER
HOW OFTEN DO YOU HAVE A DRINK CONTAINING ALCOHOL: 4 OR MORE TIMES A WEEK

## 2025-05-07 ASSESSMENT — PATIENT HEALTH QUESTIONNAIRE - PHQ9
SUM OF ALL RESPONSES TO PHQ QUESTIONS 1-9: 0
SUM OF ALL RESPONSES TO PHQ QUESTIONS 1-9: 0
1. LITTLE INTEREST OR PLEASURE IN DOING THINGS: NOT AT ALL
2. FEELING DOWN, DEPRESSED OR HOPELESS: NOT AT ALL
SUM OF ALL RESPONSES TO PHQ QUESTIONS 1-9: 0
SUM OF ALL RESPONSES TO PHQ QUESTIONS 1-9: 0

## 2025-05-07 NOTE — PROGRESS NOTES
Medicare Annual Wellness Visit    Jens Elkins is here for Medicare AWV    Assessment & Plan   Medicare annual wellness visit, subsequent  Primary hypertension  The following orders have not been finalized:  -     metoprolol succinate (TOPROL XL) 25 MG extended release tablet  Screening for prostate cancer       No follow-ups on file.     Subjective   The following acute and/or chronic problems were also addressed today:   Diagnosis Orders   1. Medicare annual wellness visit, subsequent        2. Primary hypertension        3. Screening for prostate cancer              Patient's complete Health Risk Assessment and screening values have been reviewed and are found in Flowsheets. The following problems were reviewed today and where indicated follow up appointments were made and/or referrals ordered.    Positive Risk Factor Screenings with Interventions:       Alcohol Screening:  AUDIT-C Score: 6  AUDIT Total Score: 6  Total Score Interpretation: 0-7 suggests low risk alcohol consumption but assess individual risks   Interventions:  Patient declined any further intervention or treatment                   Vision Screen:  Do you have difficulty driving, watching TV, or doing any of your daily activities because of your eyesight?: No  Have you had an eye exam within the past year?: (!) No  Interventions:   Patient encouraged to make appointment with their eye specialist    Safety:  Do you have non-slip mats or non-slip surfaces or shower bars or grab bars in your shower or bathtub?: (!) No  Interventions:  Patient declined any further interventions or treatment                   Objective   Vitals:    05/07/25 0912   BP: 110/74   BP Site: Left Upper Arm   Patient Position: Sitting   BP Cuff Size: Medium Adult   Pulse: 68   Resp: 16   Temp: 97.8 °F (36.6 °C)   TempSrc: Temporal   Weight: 83.9 kg (185 lb)   Height: 1.854 m (6' 0.99\")      Body mass index is 24.41 kg/m².      General Appearance: alert and oriented to

## 2025-05-07 NOTE — PATIENT INSTRUCTIONS
9 Ways to Cut Back on Drinking  Maybe you've found yourself drinking more alcohol than you'd prefer. If you want to cut back, here are some ideas to try.    Think before you drink.  Do you really want a drink, or is it just a habit? If you're used to having a drink at a certain time, try doing something else then.     Look for substitutes.  Find some no-alcohol drinks that you enjoy, like flavored seltzer water, tea with honey, or tonic with a slice of lime. Or try alcohol-free beer or \"virgin\" cocktails (without the alcohol).     Drink more water.  Use water to quench your thirst. Drink a glass of water before you have any alcohol. Have another glass along with every drink or between drinks.     Shrink your drink.  For example, have a bottle of beer instead of a pint. Use a smaller glass for wine. Choose drinks with lower alcohol content (ABV%). Or use less liquor and more mixer in cocktails.     Slow down.  It's easy to drink quickly and without thinking about it. Pay attention, and make each drink last longer.     Do the math.  Total up how much you spend on alcohol each month. How much is that a year? If you cut back, what could you do with the money you save?     Take a break.  Choose a day or two each week when you won't drink at all. Notice how you feel on those days, physically and emotionally. How did you sleep? Do you feel better? Over time, add more break days.     Count calories.  Would you like to lose some weight? For some people that's a good motivator for cutting back. Figure out how many calories are in each drink. How many does that add up to in a day? In a week? In a month?     Practice saying no.  Be ready when someone offers you a drink. Try: \"Thanks, I've had enough.\" Or \"Thanks, but I'm cutting back.\" Or \"No, thanks. I feel better when I drink less.\"   Current as of: August 20, 2024  Content Version: 14.4  © 6497-1713 [employer] [facility].   Care instructions adapted under license by

## 2025-07-15 LAB
ALBUMIN: 4.3 G/DL
ALP BLD-CCNC: 64 U/L
ALT SERPL-CCNC: 21 U/L
ANION GAP SERPL CALCULATED.3IONS-SCNC: 6 MMOL/L
AST SERPL-CCNC: 21 U/L
BASOPHILS ABSOLUTE: 0.1 /ΜL
BASOPHILS RELATIVE PERCENT: 0.9 %
BILIRUB SERPL-MCNC: 0.6 MG/DL (ref 0.1–1.4)
BILIRUBIN, URINE: NEGATIVE
BLOOD, URINE: NEGATIVE
BUN BLDV-MCNC: 15 MG/DL
CALCIUM SERPL-MCNC: 10.2 MG/DL
CHLORIDE BLD-SCNC: 102 MMOL/L
CHOLESTEROL, TOTAL: 213 MG/DL
CHOLESTEROL/HDL RATIO: ABNORMAL
CLARITY, UA: CLEAR
CO2: 32 MMOL/L
COLOR, UA: YELLOW
CREAT SERPL-MCNC: 0.8 MG/DL
EOSINOPHILS ABSOLUTE: 0.2 /ΜL
EOSINOPHILS RELATIVE PERCENT: 4.1 %
GFR, ESTIMATED: >90
GLUCOSE BLD-MCNC: 95 MG/DL
GLUCOSE URINE: NEGATIVE
HCT VFR BLD CALC: 39.2 % (ref 41–53)
HDLC SERPL-MCNC: 79 MG/DL (ref 35–70)
HEMOGLOBIN: 14 G/DL (ref 13.5–17.5)
KETONES, URINE: NEGATIVE
LDL CHOLESTEROL: 118
LEUKOCYTE ESTERASE, URINE: NEGATIVE
LYMPHOCYTES ABSOLUTE: 1.2 /ΜL
LYMPHOCYTES RELATIVE PERCENT: 21.3 %
MCH RBC QN AUTO: 33.8 PG
MCHC RBC AUTO-ENTMCNC: 35.7 G/DL
MCV RBC AUTO: 94.7 FL
MONOCYTES ABSOLUTE: 0.8 /ΜL
MONOCYTES RELATIVE PERCENT: 14.1 %
NEUTROPHILS ABSOLUTE: 3.4 /ΜL
NEUTROPHILS RELATIVE PERCENT: 59.3 %
NITRITE, URINE: NEGATIVE
NONHDLC SERPL-MCNC: 134 MG/DL
PDW BLD-RTO: 11.8 %
PH UA: 6.5 (ref 4.5–8)
PLATELET # BLD: 178 K/ΜL
PMV BLD AUTO: 12 FL
POTASSIUM SERPL-SCNC: 4.4 MMOL/L
PROSTATE SPECIFIC ANTIGEN PERCENT FREE: NORMAL
PROSTATE SPECIFIC ANTIGEN: 2.42 NG/ML
PROTEIN UA: NEGATIVE
PSA, FREE: NORMAL
RBC # BLD: 4.14 10^6/ΜL
SODIUM BLD-SCNC: 140 MMOL/L
SPECIFIC GRAVITY UA: 1.02 (ref 1–1.03)
TOTAL PROTEIN: 7.2 G/DL (ref 6.4–8.2)
TRIGL SERPL-MCNC: 78 MG/DL
UROBILINOGEN, URINE: NORMAL
VLDLC SERPL CALC-MCNC: ABNORMAL MG/DL
WBC # BLD: 5.8 10^3/ML

## (undated) DEVICE — ELECTRO LUBE IS A SINGLE PATIENT USE DEVICE THAT IS INTENDED TO BE USED ON ELECTROSURGICAL ELECTRODES TO REDUCE STICKING.: Brand: KEY SURGICAL ELECTRO LUBE

## (undated) DEVICE — INSUFFLATION NEEDLE TO ESTABLISH PNEUMOPERITONEUM.: Brand: INSUFFLATION NEEDLE

## (undated) DEVICE — BANDAGE ADH W1XL3IN NAT FAB WVN FLX DURABLE N ADH PD SEAL

## (undated) DEVICE — CANNULA SEAL

## (undated) DEVICE — ARM DRAPE

## (undated) DEVICE — COLUMN DRAPE

## (undated) DEVICE — SUTURE V-LOC 180 SZ 2-0 L9IN ABSRB GRN L27MM GS-22 1/2 CIR VLOCL2145

## (undated) DEVICE — TIP COVER ACCESSORY

## (undated) DEVICE — GLOVE SURG SZ 7.5 L11.73IN FNGR THK9.8MIL STRW LTX POLYMER

## (undated) DEVICE — BLADELESS OBTURATOR: Brand: WECK VISTA

## (undated) DEVICE — TUBING INSUFFLATOR HEAT HUMIDIFIED SMK EVAC SET PNEUMOCLEAR

## (undated) DEVICE — 35 ML SYRINGE LUER-LOCK TIP: Brand: MONOJECT

## (undated) DEVICE — GENERAL LAPAROSCOPY-LF: Brand: MEDLINE INDUSTRIES, INC.